# Patient Record
Sex: MALE | Race: WHITE | Employment: FULL TIME
[De-identification: names, ages, dates, MRNs, and addresses within clinical notes are randomized per-mention and may not be internally consistent; named-entity substitution may affect disease eponyms.]

---

## 2017-04-27 ENCOUNTER — TELEPHONE (OUTPATIENT)
Dept: CASE MANAGEMENT | Age: 47
End: 2017-04-27

## 2018-03-30 ENCOUNTER — OFFICE VISIT (OUTPATIENT)
Dept: FAMILY MEDICINE CLINIC | Age: 48
End: 2018-03-30
Payer: COMMERCIAL

## 2018-03-30 VITALS
DIASTOLIC BLOOD PRESSURE: 82 MMHG | WEIGHT: 149 LBS | HEART RATE: 95 BPM | OXYGEN SATURATION: 96 % | SYSTOLIC BLOOD PRESSURE: 138 MMHG | RESPIRATION RATE: 18 BRPM | HEIGHT: 64 IN | BODY MASS INDEX: 25.44 KG/M2

## 2018-03-30 DIAGNOSIS — Z11.4 SCREENING FOR HIV (HUMAN IMMUNODEFICIENCY VIRUS): ICD-10-CM

## 2018-03-30 DIAGNOSIS — E78.5 DYSLIPIDEMIA: ICD-10-CM

## 2018-03-30 DIAGNOSIS — Z76.89 ENCOUNTER TO ESTABLISH CARE: Primary | ICD-10-CM

## 2018-03-30 PROCEDURE — 99213 OFFICE O/P EST LOW 20 MIN: CPT | Performed by: FAMILY MEDICINE

## 2018-04-12 ENCOUNTER — OFFICE VISIT (OUTPATIENT)
Dept: FAMILY MEDICINE CLINIC | Age: 48
End: 2018-04-12
Payer: COMMERCIAL

## 2018-04-12 VITALS
DIASTOLIC BLOOD PRESSURE: 88 MMHG | BODY MASS INDEX: 25.1 KG/M2 | OXYGEN SATURATION: 98 % | SYSTOLIC BLOOD PRESSURE: 120 MMHG | HEIGHT: 64 IN | RESPIRATION RATE: 16 BRPM | WEIGHT: 147 LBS | HEART RATE: 88 BPM | TEMPERATURE: 99.2 F

## 2018-04-12 DIAGNOSIS — J98.01 BRONCHOSPASM: ICD-10-CM

## 2018-04-12 DIAGNOSIS — J40 BRONCHITIS: Primary | ICD-10-CM

## 2018-04-12 PROCEDURE — 99213 OFFICE O/P EST LOW 20 MIN: CPT | Performed by: PHYSICIAN ASSISTANT

## 2018-04-12 RX ORDER — DOXYCYCLINE HYCLATE 100 MG
100 TABLET ORAL 2 TIMES DAILY
Qty: 20 TABLET | Refills: 0 | Status: SHIPPED | OUTPATIENT
Start: 2018-04-12 | End: 2018-04-22

## 2018-04-12 RX ORDER — OMEPRAZOLE 10 MG/1
10 CAPSULE, DELAYED RELEASE ORAL DAILY
COMMUNITY
End: 2018-04-23 | Stop reason: ALTCHOICE

## 2018-04-20 ENCOUNTER — OFFICE VISIT (OUTPATIENT)
Dept: FAMILY MEDICINE CLINIC | Age: 48
End: 2018-04-20
Payer: COMMERCIAL

## 2018-04-20 VITALS
HEIGHT: 64 IN | DIASTOLIC BLOOD PRESSURE: 84 MMHG | HEART RATE: 100 BPM | WEIGHT: 146 LBS | SYSTOLIC BLOOD PRESSURE: 134 MMHG | BODY MASS INDEX: 24.92 KG/M2 | OXYGEN SATURATION: 98 % | RESPIRATION RATE: 18 BRPM

## 2018-04-20 DIAGNOSIS — R31.9 HEMATURIA, UNSPECIFIED TYPE: Primary | ICD-10-CM

## 2018-04-20 DIAGNOSIS — E78.5 DYSLIPIDEMIA: ICD-10-CM

## 2018-04-20 PROCEDURE — 99213 OFFICE O/P EST LOW 20 MIN: CPT | Performed by: FAMILY MEDICINE

## 2018-04-22 ENCOUNTER — HOSPITAL ENCOUNTER (OUTPATIENT)
Age: 48
Discharge: HOME | End: 2018-04-22
Payer: COMMERCIAL

## 2018-04-22 DIAGNOSIS — R31.9 HEMATURIA, UNSPECIFIED TYPE: ICD-10-CM

## 2018-04-22 DIAGNOSIS — E78.5 DYSLIPIDEMIA: ICD-10-CM

## 2018-04-22 DIAGNOSIS — Z11.4 SCREENING FOR HIV (HUMAN IMMUNODEFICIENCY VIRUS): ICD-10-CM

## 2018-04-22 LAB
ALBUMIN SERPL-MCNC: 4.6 G/DL (ref 3.5–5.2)
ALP BLD-CCNC: 86 U/L (ref 40–129)
ALT SERPL-CCNC: 26 U/L (ref 0–40)
AMORPHOUS: NORMAL
ANION GAP SERPL CALCULATED.3IONS-SCNC: 10 MMOL/L (ref 7–16)
AST SERPL-CCNC: 19 U/L (ref 0–39)
BACTERIA: NORMAL /HPF
BILIRUB SERPL-MCNC: 0.5 MG/DL (ref 0–1.2)
BILIRUBIN URINE: NEGATIVE
BLOOD, URINE: NORMAL
BUN BLDV-MCNC: 17 MG/DL (ref 6–20)
CALCIUM SERPL-MCNC: 9.6 MG/DL (ref 8.6–10.2)
CHLORIDE BLD-SCNC: 104 MMOL/L (ref 98–107)
CHOLESTEROL, TOTAL: 203 MG/DL (ref 0–199)
CLARITY: CLEAR
CO2: 28 MMOL/L (ref 22–29)
COLOR: YELLOW
CREAT SERPL-MCNC: 1 MG/DL (ref 0.7–1.2)
EPITHELIAL CELLS, UA: NORMAL /HPF
GFR AFRICAN AMERICAN: >60
GFR NON-AFRICAN AMERICAN: >60 ML/MIN/1.73
GLUCOSE BLD-MCNC: 106 MG/DL (ref 74–109)
GLUCOSE URINE: NEGATIVE MG/DL
HBA1C MFR BLD: 5.6 % (ref 4.8–5.9)
HCT VFR BLD CALC: 47.8 % (ref 37–54)
HDLC SERPL-MCNC: 61 MG/DL
HEMOGLOBIN: 15.9 G/DL (ref 12.5–16.5)
KETONES, URINE: NEGATIVE MG/DL
LDL CHOLESTEROL CALCULATED: 119 MG/DL (ref 0–99)
LEUKOCYTE ESTERASE, URINE: NEGATIVE
MCH RBC QN AUTO: 29.9 PG (ref 26–35)
MCHC RBC AUTO-ENTMCNC: 33.3 % (ref 32–34.5)
MCV RBC AUTO: 89.8 FL (ref 80–99.9)
NITRITE, URINE: NEGATIVE
PDW BLD-RTO: 12.9 FL (ref 11.5–15)
PH UA: 5.5 (ref 5–9)
PLATELET # BLD: 285 E9/L (ref 130–450)
PMV BLD AUTO: 9.6 FL (ref 7–12)
POTASSIUM SERPL-SCNC: 4.7 MMOL/L (ref 3.5–5)
PROTEIN UA: NEGATIVE MG/DL
RBC # BLD: 5.32 E12/L (ref 3.8–5.8)
RBC UA: NORMAL /HPF (ref 0–2)
SODIUM BLD-SCNC: 142 MMOL/L (ref 132–146)
SPECIFIC GRAVITY UA: >=1.03 (ref 1–1.03)
TOTAL PROTEIN: 8 G/DL (ref 6.4–8.3)
TRIGL SERPL-MCNC: 114 MG/DL (ref 0–149)
TSH SERPL DL<=0.05 MIU/L-ACNC: 3.3 UIU/ML (ref 0.27–4.2)
UROBILINOGEN, URINE: 0.2 E.U./DL
VITAMIN D 25-HYDROXY: 13 NG/ML (ref 30–100)
VLDLC SERPL CALC-MCNC: 23 MG/DL
WBC # BLD: 7.1 E9/L (ref 4.5–11.5)
WBC UA: NORMAL /HPF (ref 0–5)

## 2018-04-22 PROCEDURE — 80061 LIPID PANEL: CPT

## 2018-04-22 PROCEDURE — 85027 COMPLETE CBC AUTOMATED: CPT

## 2018-04-22 PROCEDURE — 82306 VITAMIN D 25 HYDROXY: CPT

## 2018-04-22 PROCEDURE — 36415 COLL VENOUS BLD VENIPUNCTURE: CPT

## 2018-04-22 PROCEDURE — 86703 HIV-1/HIV-2 1 RESULT ANTBDY: CPT

## 2018-04-22 PROCEDURE — 84443 ASSAY THYROID STIM HORMONE: CPT

## 2018-04-22 PROCEDURE — 81001 URINALYSIS AUTO W/SCOPE: CPT

## 2018-04-22 PROCEDURE — 80053 COMPREHEN METABOLIC PANEL: CPT

## 2018-04-22 PROCEDURE — 83036 HEMOGLOBIN GLYCOSYLATED A1C: CPT

## 2018-04-23 ENCOUNTER — TELEPHONE (OUTPATIENT)
Dept: FAMILY MEDICINE CLINIC | Age: 48
End: 2018-04-23

## 2018-04-23 ENCOUNTER — APPOINTMENT (OUTPATIENT)
Dept: CT IMAGING | Age: 48
End: 2018-04-23
Payer: COMMERCIAL

## 2018-04-23 ENCOUNTER — HOSPITAL ENCOUNTER (EMERGENCY)
Age: 48
Discharge: HOME | End: 2018-04-23
Attending: EMERGENCY MEDICINE
Payer: COMMERCIAL

## 2018-04-23 VITALS
HEIGHT: 64 IN | SYSTOLIC BLOOD PRESSURE: 110 MMHG | OXYGEN SATURATION: 97 % | RESPIRATION RATE: 19 BRPM | DIASTOLIC BLOOD PRESSURE: 76 MMHG | WEIGHT: 146 LBS | TEMPERATURE: 98.8 F | BODY MASS INDEX: 24.92 KG/M2 | HEART RATE: 85 BPM

## 2018-04-23 DIAGNOSIS — R00.0 TACHYCARDIA: Primary | ICD-10-CM

## 2018-04-23 DIAGNOSIS — R42 DIZZINESS: ICD-10-CM

## 2018-04-23 DIAGNOSIS — F41.1 ANXIETY STATE: Primary | ICD-10-CM

## 2018-04-23 LAB
ANION GAP SERPL CALCULATED.3IONS-SCNC: 14 MMOL/L (ref 7–16)
BUN BLDV-MCNC: 17 MG/DL (ref 6–20)
CALCIUM SERPL-MCNC: 9.6 MG/DL (ref 8.6–10.2)
CHLORIDE BLD-SCNC: 99 MMOL/L (ref 98–107)
CO2: 26 MMOL/L (ref 22–29)
CREAT SERPL-MCNC: 1 MG/DL (ref 0.7–1.2)
EKG ATRIAL RATE: 89 BPM
EKG P AXIS: 36 DEGREES
EKG P-R INTERVAL: 110 MS
EKG Q-T INTERVAL: 332 MS
EKG QRS DURATION: 92 MS
EKG QTC CALCULATION (BAZETT): 403 MS
EKG R AXIS: 25 DEGREES
EKG T AXIS: 27 DEGREES
EKG VENTRICULAR RATE: 89 BPM
GFR AFRICAN AMERICAN: >60
GFR NON-AFRICAN AMERICAN: >60 ML/MIN/1.73
GLUCOSE BLD-MCNC: 121 MG/DL (ref 74–109)
HIV-1 AND HIV-2 ANTIBODIES: NORMAL
MAGNESIUM: 2.1 MG/DL (ref 1.6–2.6)
POTASSIUM SERPL-SCNC: 3.9 MMOL/L (ref 3.5–5)
SODIUM BLD-SCNC: 139 MMOL/L (ref 132–146)

## 2018-04-23 PROCEDURE — 6370000000 HC RX 637 (ALT 250 FOR IP): Performed by: EMERGENCY MEDICINE

## 2018-04-23 PROCEDURE — 6360000004 HC RX CONTRAST MEDICATION: Performed by: RADIOLOGY

## 2018-04-23 PROCEDURE — 71275 CT ANGIOGRAPHY CHEST: CPT

## 2018-04-23 PROCEDURE — 99285 EMERGENCY DEPT VISIT HI MDM: CPT

## 2018-04-23 PROCEDURE — 80048 BASIC METABOLIC PNL TOTAL CA: CPT

## 2018-04-23 PROCEDURE — 83735 ASSAY OF MAGNESIUM: CPT

## 2018-04-23 RX ORDER — LORAZEPAM 1 MG/1
1 TABLET ORAL ONCE
Status: COMPLETED | OUTPATIENT
Start: 2018-04-23 | End: 2018-04-23

## 2018-04-23 RX ORDER — DOXYCYCLINE HYCLATE 100 MG
100 TABLET ORAL 2 TIMES DAILY
COMMUNITY
End: 2018-04-23 | Stop reason: ALTCHOICE

## 2018-04-23 RX ORDER — ERGOCALCIFEROL 1.25 MG/1
50000 CAPSULE ORAL WEEKLY
Qty: 12 CAPSULE | Refills: 0 | Status: SHIPPED | OUTPATIENT
Start: 2018-04-23 | End: 2018-07-30

## 2018-04-23 RX ADMIN — Medication 30 ML: at 10:18

## 2018-04-23 RX ADMIN — LORAZEPAM 1 MG: 1 TABLET ORAL at 10:23

## 2018-04-23 RX ADMIN — IOPAMIDOL 90 ML: 755 INJECTION, SOLUTION INTRAVENOUS at 11:41

## 2018-04-23 ASSESSMENT — ENCOUNTER SYMPTOMS
ORTHOPNEA: 0
HEMOPTYSIS: 0
VOMITING: 0
ABDOMINAL PAIN: 0
EYE DISCHARGE: 0
DIARRHEA: 0
NAUSEA: 0
SHORTNESS OF BREATH: 0
EYE PAIN: 0
WHEEZING: 0
EYE REDNESS: 0
BACK PAIN: 0
COUGH: 0
SINUS PRESSURE: 0
SORE THROAT: 0

## 2018-04-23 ASSESSMENT — PAIN DESCRIPTION - PAIN TYPE: TYPE: ACUTE PAIN

## 2018-04-23 ASSESSMENT — PAIN DESCRIPTION - ORIENTATION: ORIENTATION: MID

## 2018-04-23 ASSESSMENT — PAIN SCALES - GENERAL: PAINLEVEL_OUTOF10: 2

## 2018-04-23 ASSESSMENT — PAIN DESCRIPTION - ONSET: ONSET: GRADUAL

## 2018-04-23 ASSESSMENT — PAIN DESCRIPTION - LOCATION: LOCATION: CHEST

## 2018-04-23 ASSESSMENT — PAIN DESCRIPTION - PROGRESSION: CLINICAL_PROGRESSION: GRADUALLY WORSENING

## 2018-04-23 ASSESSMENT — PAIN DESCRIPTION - DESCRIPTORS: DESCRIPTORS: BURNING

## 2018-04-23 ASSESSMENT — PAIN DESCRIPTION - FREQUENCY: FREQUENCY: INTERMITTENT

## 2018-07-30 ENCOUNTER — OFFICE VISIT (OUTPATIENT)
Dept: FAMILY MEDICINE CLINIC | Age: 48
End: 2018-07-30
Payer: COMMERCIAL

## 2018-07-30 VITALS
WEIGHT: 147 LBS | SYSTOLIC BLOOD PRESSURE: 145 MMHG | HEIGHT: 64 IN | RESPIRATION RATE: 16 BRPM | HEART RATE: 88 BPM | BODY MASS INDEX: 25.1 KG/M2 | DIASTOLIC BLOOD PRESSURE: 101 MMHG

## 2018-07-30 DIAGNOSIS — I49.9 IRREGULAR HEART BEAT: Primary | ICD-10-CM

## 2018-07-30 PROCEDURE — 99213 OFFICE O/P EST LOW 20 MIN: CPT | Performed by: FAMILY MEDICINE

## 2018-07-30 ASSESSMENT — ENCOUNTER SYMPTOMS
CONSTIPATION: 0
WHEEZING: 0
ABDOMINAL PAIN: 0
DIARRHEA: 0
NAUSEA: 1
VOMITING: 0
SHORTNESS OF BREATH: 1
COUGH: 0

## 2018-08-07 ENCOUNTER — HOSPITAL ENCOUNTER (OUTPATIENT)
Dept: SLEEP CENTER | Age: 48
Discharge: HOME | End: 2018-08-07
Payer: COMMERCIAL

## 2018-08-07 PROCEDURE — 93225 XTRNL ECG REC<48 HRS REC: CPT

## 2018-08-07 PROCEDURE — 93226 XTRNL ECG REC<48 HR SCAN A/R: CPT

## 2018-08-15 DIAGNOSIS — I49.9 IRREGULAR HEART BEAT: ICD-10-CM

## 2018-09-04 ENCOUNTER — HOSPITAL ENCOUNTER (OUTPATIENT)
Age: 48
Discharge: HOME | End: 2018-09-04
Payer: COMMERCIAL

## 2018-09-04 ENCOUNTER — OFFICE VISIT (OUTPATIENT)
Dept: FAMILY MEDICINE CLINIC | Age: 48
End: 2018-09-04
Payer: COMMERCIAL

## 2018-09-04 VITALS
WEIGHT: 150 LBS | DIASTOLIC BLOOD PRESSURE: 96 MMHG | HEART RATE: 96 BPM | BODY MASS INDEX: 25.61 KG/M2 | SYSTOLIC BLOOD PRESSURE: 136 MMHG | OXYGEN SATURATION: 98 % | HEIGHT: 64 IN | RESPIRATION RATE: 16 BRPM

## 2018-09-04 DIAGNOSIS — R40.0 DAYTIME SLEEPINESS: ICD-10-CM

## 2018-09-04 DIAGNOSIS — R79.89 LOW VITAMIN D LEVEL: ICD-10-CM

## 2018-09-04 DIAGNOSIS — R00.2 PALPITATIONS: Primary | ICD-10-CM

## 2018-09-04 DIAGNOSIS — R06.83 SNORING: ICD-10-CM

## 2018-09-04 DIAGNOSIS — G47.9 RESTLESS SLEEPER: ICD-10-CM

## 2018-09-04 LAB — VITAMIN D 25-HYDROXY: 18 NG/ML (ref 30–100)

## 2018-09-04 PROCEDURE — 82306 VITAMIN D 25 HYDROXY: CPT

## 2018-09-04 PROCEDURE — 99213 OFFICE O/P EST LOW 20 MIN: CPT | Performed by: FAMILY MEDICINE

## 2018-09-04 PROCEDURE — 36415 COLL VENOUS BLD VENIPUNCTURE: CPT

## 2018-09-04 ASSESSMENT — ENCOUNTER SYMPTOMS
CONSTIPATION: 0
NAUSEA: 0
DIARRHEA: 0
VOMITING: 0

## 2018-09-04 ASSESSMENT — PATIENT HEALTH QUESTIONNAIRE - PHQ9
SUM OF ALL RESPONSES TO PHQ9 QUESTIONS 1 & 2: 0
2. FEELING DOWN, DEPRESSED OR HOPELESS: 0
1. LITTLE INTEREST OR PLEASURE IN DOING THINGS: 0
SUM OF ALL RESPONSES TO PHQ QUESTIONS 1-9: 0
SUM OF ALL RESPONSES TO PHQ QUESTIONS 1-9: 0

## 2018-09-04 NOTE — PROGRESS NOTES
Chief Complaint   Patient presents with    Irregular Heart Beat     follow up       HPI:  The patient is a 51 y.o. male who presented to the office today for follow up on palpitations. Patient has been seen by cardiology at [facility] in the mean time. Blood work for rheumatological origin of palpitations (CRP, NEVIN, WSR) negative. Extended rhythm recording device started 8/15/18. Palpitations  - continues to be daily  - had four days around birthday where he was feeling pretty good  - last Tuesday: \"heart was flopping all around\" after drinking a Gatorade  - \"Feels like someone is holding on to my heart and it can't pump\"   - occasional tingling sensation over whole body   - feels the cardiologist in [facility] was more concerned about the cough he had than his heart  - lying on his left side aggravates the symptoms   - does not drink caffienated drinks   - had sleep study scheduled in the past     STOP-Bang FRANDY Questions  Snoring (Do you snore loudly)? yes  Tiredness (Do you often feel tired, fatigued or sleepy during the daytime?) yes  Observed Apnea (Has anyone observed that you stop breathing, or choke or gasp during sleep?) no  High Blood Pressure (Do you have or are you being treated for HTN?) not treated, intermittent high readings   BMI (Is your body mass index more than 35 kg/m2 ?) no  Age (Are you older than 50 years?) no  Neck Circumference (Is your neck circumference greater than 40 cm [15.75 in]?) not measured  Gender (Are you male?) yes  ---------------------------------------  0-2: low risk  3-4: intermediate risk  5-8: high risk    Very restless sleeper. Wife reports that patient is a very restless sleeper, often kicking her in his sleep. Patient denies episodes of sleep paralysis and cataplexy.        Causes of Palpitation   Cardiac Patient factors   Any arrhythmia PVCs seen on 48h Holter   Cardiac and extracardiac shunts Echo unremarkable   Valvular heart disease Echo unremarkable

## 2018-09-06 ENCOUNTER — TELEPHONE (OUTPATIENT)
Dept: FAMILY MEDICINE CLINIC | Age: 48
End: 2018-09-06

## 2018-10-05 ENCOUNTER — OFFICE VISIT (OUTPATIENT)
Dept: FAMILY MEDICINE CLINIC | Age: 48
End: 2018-10-05
Payer: COMMERCIAL

## 2018-10-05 VITALS
WEIGHT: 151 LBS | DIASTOLIC BLOOD PRESSURE: 92 MMHG | HEIGHT: 64 IN | BODY MASS INDEX: 25.78 KG/M2 | OXYGEN SATURATION: 98 % | RESPIRATION RATE: 16 BRPM | SYSTOLIC BLOOD PRESSURE: 130 MMHG | HEART RATE: 93 BPM

## 2018-10-05 DIAGNOSIS — I10 ESSENTIAL HYPERTENSION: Primary | ICD-10-CM

## 2018-10-05 PROCEDURE — 99213 OFFICE O/P EST LOW 20 MIN: CPT | Performed by: FAMILY MEDICINE

## 2018-10-05 RX ORDER — LISINOPRIL 5 MG/1
5 TABLET ORAL DAILY
Qty: 30 TABLET | Refills: 3 | Status: SHIPPED | OUTPATIENT
Start: 2018-10-05 | End: 2018-10-09 | Stop reason: SINTOL

## 2018-10-05 RX ORDER — MULTIVIT-MIN/IRON/FOLIC ACID/K 18-600-40
CAPSULE ORAL
COMMUNITY
End: 2020-05-28 | Stop reason: ALTCHOICE

## 2018-10-05 ASSESSMENT — ENCOUNTER SYMPTOMS
CONSTIPATION: 0
COUGH: 1
DIARRHEA: 0
NAUSEA: 0
ABDOMINAL PAIN: 0
SHORTNESS OF BREATH: 1
VOMITING: 0

## 2018-10-05 NOTE — PROGRESS NOTES
S: 51 y.o. male with   Chief Complaint   Patient presents with    Palpitations     follow up       Palpitations - treated for anxiety at ER after an episode. Saw cardiology in the past and w/u with 48h monitor. Still having some sx and had re-eval with [facility] cardiology. ECHO done and remainder of w/u unremarkable. O: VS:  height is 5' 4\" (1.626 m) and weight is 151 lb (68.5 kg). His blood pressure is 130/92 (abnormal) and his pulse is 93. His respiration is 16 and oxygen saturation is 98%. AAO/NAD, appropriate affect for mood  CV:  RRR, no murmur  Resp: CTAB      Impression/Plan:   1) Essential Hypertension - particularly the diastolic has been elevated. Will need follow up for moods. Ok for lisinopril 10mg and BMP in 2weeks. Health Maintenance Due   Topic Date Due    DTaP/Tdap/Td vaccine (1 - Tdap) 08/24/1988    Flu vaccine (1) 09/01/2018         Attending Physician Statement  I have discussed the case, including pertinent history and exam findings with the resident. I agree with the documented assessment and plan.       Purvi Suero MD

## 2018-10-09 ENCOUNTER — TELEPHONE (OUTPATIENT)
Dept: FAMILY MEDICINE CLINIC | Age: 48
End: 2018-10-09

## 2018-10-09 DIAGNOSIS — I10 ESSENTIAL HYPERTENSION: Primary | ICD-10-CM

## 2018-10-09 RX ORDER — LOSARTAN POTASSIUM 25 MG/1
25 TABLET ORAL DAILY
Qty: 30 TABLET | Refills: 0 | Status: SHIPPED | OUTPATIENT
Start: 2018-10-09 | End: 2018-11-07 | Stop reason: SDUPTHER

## 2018-10-17 ENCOUNTER — TELEPHONE (OUTPATIENT)
Dept: FAMILY MEDICINE CLINIC | Age: 48
End: 2018-10-17

## 2018-10-17 NOTE — TELEPHONE ENCOUNTER
Patient called in stating he had palpitations and heart racing last night. Did not go to ER, did not want to make an appt with anyone at this time. He wanted to know if you wanted his BP medication, he did not take his BP to give me those numbers. Advised he needed to go to ER or see someone to adjust and evaluate his BP reading.  thanks

## 2018-10-24 ENCOUNTER — HOSPITAL ENCOUNTER (OUTPATIENT)
Dept: SLEEP CENTER | Age: 48
Discharge: HOME | End: 2018-10-24
Payer: COMMERCIAL

## 2018-10-24 DIAGNOSIS — R40.0 DAYTIME SLEEPINESS: ICD-10-CM

## 2018-10-24 DIAGNOSIS — G47.9 RESTLESS SLEEPER: ICD-10-CM

## 2018-10-24 DIAGNOSIS — R00.2 PALPITATIONS: ICD-10-CM

## 2018-10-24 DIAGNOSIS — R06.83 SNORING: ICD-10-CM

## 2018-10-24 PROCEDURE — 95810 POLYSOM 6/> YRS 4/> PARAM: CPT

## 2018-10-24 ASSESSMENT — SLEEP AND FATIGUE QUESTIONNAIRES
HOW LIKELY ARE YOU TO NOD OFF OR FALL ASLEEP WHILE SITTING QUIETLY AFTER LUNCH WITHOUT ALCOHOL: 2
HOW LIKELY ARE YOU TO NOD OFF OR FALL ASLEEP IN A CAR, WHILE STOPPED FOR A FEW MINUTES IN TRAFFIC: 0
HOW LIKELY ARE YOU TO NOD OFF OR FALL ASLEEP WHILE SITTING AND TALKING TO SOMEONE: 0
HOW LIKELY ARE YOU TO NOD OFF OR FALL ASLEEP WHILE SITTING INACTIVE IN A PUBLIC PLACE: 0
HOW LIKELY ARE YOU TO NOD OFF OR FALL ASLEEP WHILE LYING DOWN TO REST IN THE AFTERNOON WHEN CIRCUMSTANCES PERMIT: 3
HOW LIKELY ARE YOU TO NOD OFF OR FALL ASLEEP WHILE SITTING AND READING: 2
HOW LIKELY ARE YOU TO NOD OFF OR FALL ASLEEP WHEN YOU ARE A PASSENGER IN A CAR FOR AN HOUR WITHOUT A BREAK: 2
ESS TOTAL SCORE: 10
HOW LIKELY ARE YOU TO NOD OFF OR FALL ASLEEP WHILE WATCHING TV: 1

## 2018-10-25 VITALS
SYSTOLIC BLOOD PRESSURE: 122 MMHG | HEIGHT: 64 IN | HEART RATE: 78 BPM | WEIGHT: 145 LBS | OXYGEN SATURATION: 95 % | BODY MASS INDEX: 24.75 KG/M2 | DIASTOLIC BLOOD PRESSURE: 91 MMHG

## 2018-10-30 ENCOUNTER — HOSPITAL ENCOUNTER (OUTPATIENT)
Age: 48
Discharge: HOME | End: 2018-10-30
Payer: COMMERCIAL

## 2018-10-30 DIAGNOSIS — I10 ESSENTIAL HYPERTENSION: ICD-10-CM

## 2018-10-30 LAB
ANION GAP SERPL CALCULATED.3IONS-SCNC: 11 MMOL/L (ref 7–16)
BUN BLDV-MCNC: 14 MG/DL (ref 6–20)
CALCIUM SERPL-MCNC: 9.6 MG/DL (ref 8.6–10.2)
CHLORIDE BLD-SCNC: 99 MMOL/L (ref 98–107)
CO2: 29 MMOL/L (ref 22–29)
CREAT SERPL-MCNC: 0.9 MG/DL (ref 0.7–1.2)
GFR AFRICAN AMERICAN: >60
GFR NON-AFRICAN AMERICAN: >60 ML/MIN/1.73
GLUCOSE BLD-MCNC: 95 MG/DL (ref 74–109)
POTASSIUM SERPL-SCNC: 3.9 MMOL/L (ref 3.5–5)
SODIUM BLD-SCNC: 139 MMOL/L (ref 132–146)

## 2018-10-30 PROCEDURE — 36415 COLL VENOUS BLD VENIPUNCTURE: CPT

## 2018-10-30 PROCEDURE — 80048 BASIC METABOLIC PNL TOTAL CA: CPT

## 2018-11-05 ENCOUNTER — TELEPHONE (OUTPATIENT)
Dept: FAMILY MEDICINE CLINIC | Age: 48
End: 2018-11-05

## 2018-11-05 DIAGNOSIS — G47.61 PERIODIC LIMB MOVEMENTS OF SLEEP: Primary | ICD-10-CM

## 2018-11-07 ENCOUNTER — OFFICE VISIT (OUTPATIENT)
Dept: FAMILY MEDICINE CLINIC | Age: 48
End: 2018-11-07
Payer: COMMERCIAL

## 2018-11-07 VITALS
HEIGHT: 64 IN | HEART RATE: 101 BPM | BODY MASS INDEX: 25.78 KG/M2 | SYSTOLIC BLOOD PRESSURE: 130 MMHG | WEIGHT: 151 LBS | RESPIRATION RATE: 16 BRPM | DIASTOLIC BLOOD PRESSURE: 92 MMHG

## 2018-11-07 DIAGNOSIS — I10 ESSENTIAL HYPERTENSION: ICD-10-CM

## 2018-11-07 PROCEDURE — 99213 OFFICE O/P EST LOW 20 MIN: CPT | Performed by: FAMILY MEDICINE

## 2018-11-07 RX ORDER — LOSARTAN POTASSIUM 25 MG/1
25 TABLET ORAL DAILY
Qty: 30 TABLET | Refills: 5 | Status: SHIPPED | OUTPATIENT
Start: 2018-11-07 | End: 2018-12-28

## 2018-11-07 ASSESSMENT — ENCOUNTER SYMPTOMS
SHORTNESS OF BREATH: 1
ABDOMINAL PAIN: 0
DIARRHEA: 0
NAUSEA: 0
VOMITING: 0
CONSTIPATION: 0
COUGH: 1

## 2018-11-07 NOTE — PROGRESS NOTES
Follow up of palpitations  Recent cardiac work up is negative  Examination  Blood pressure (!) 130/92, pulse 101, resp. rate 16, height 5' 4\" (1.626 m), weight 151 lb (68.5 kg). Heart regular  Lungs clear  A/P  24 hour urine for catecholamines  Attending Physician Statement  I have discussed the case, including pertinent history and exam findings with the resident. I agree with the documented assessment and plan.
 Hypertension Father     Parkinsonism Mother 48    Heart Disease Paternal Uncle     Kidney Cancer Paternal Uncle     Cancer Maternal Aunt         bladder cancer    Heart Disease Maternal Aunt         Hypertrophy    Heart Disease Paternal Grandfather        Past Surgical History:   Procedure Laterality Date    CHOLECYSTECTOMY      INGUINAL HERNIA REPAIR      LITHOTRIPSY      TONSILLECTOMY         Social History   Substance Use Topics    Smoking status: Never Smoker    Smokeless tobacco: Never Used    Alcohol use No       BP (!) 130/92   Pulse 101   Resp 16   Ht 5' 4\" (1.626 m)   Wt 151 lb (68.5 kg)   BMI 25.92 kg/m²     Review of Systems   Constitutional: Negative for chills and fever. Respiratory: Positive for cough and shortness of breath. Cardiovascular: Positive for palpitations. Negative for chest pain. Gastrointestinal: Negative for abdominal pain, constipation, diarrhea, nausea and vomiting. Genitourinary: Negative for dysuria, frequency and hematuria. Skin: Negative for rash. Neurological: Positive for light-headedness and headaches. Negative for dizziness. Physical Exam   Constitutional: He is oriented to person, place, and time. He appears well-developed and well-nourished. No distress. HENT:   Head: Normocephalic and atraumatic. Eyes: Conjunctivae are normal.   Neck: Neck supple. Cardiovascular: Normal rate, regular rhythm and normal heart sounds. Exam reveals no gallop and no friction rub. No murmur heard. Pulmonary/Chest: Effort normal and breath sounds normal. No respiratory distress. He has no wheezes. He has no rales. Abdominal: Soft. He exhibits no distension. There is no tenderness. There is no rebound and no guarding. Musculoskeletal: He exhibits no edema. Neurological: He is alert and oriented to person, place, and time. Skin: Skin is warm and dry. He is not diaphoretic. Psychiatric: He has a normal mood and affect.  His behavior is

## 2018-11-11 ENCOUNTER — HOSPITAL ENCOUNTER (OUTPATIENT)
Age: 48
Discharge: HOME | End: 2018-11-11
Payer: COMMERCIAL

## 2018-11-11 DIAGNOSIS — I10 ESSENTIAL HYPERTENSION: ICD-10-CM

## 2018-11-11 PROCEDURE — 83835 ASSAY OF METANEPHRINES: CPT

## 2018-11-15 LAB
CREATININE 24 HOUR URINE: 1113 MG/D (ref 1000–2500)
CREATININE URINE: 106 MG/DL
HOURS COLLECTED: 24 HR
METANEPHRINE INTREP URINE: NORMAL
METANEPHRINE UG/G CRE: 90 UG/G CRT (ref 0–300)
METANEPHRINE, UR-PER VOL: 95 UG/L
METANEPHRINES URINE: 100 UG/D (ref 62–207)
NORMETANEPHRINE 24 HOUR URINE: 232 UG/D (ref 125–510)
NORMETANEPHRINE, (G/CRT): 208 UG/G CRT (ref 0–400)
NORMETANEPHRINES, NMOL/L: 221 UG/L
URINE TOTAL VOLUME: 1050 ML

## 2018-12-10 ENCOUNTER — TELEPHONE (OUTPATIENT)
Dept: FAMILY MEDICINE CLINIC | Age: 48
End: 2018-12-10

## 2018-12-10 DIAGNOSIS — R00.2 PALPITATIONS: Primary | ICD-10-CM

## 2018-12-10 NOTE — TELEPHONE ENCOUNTER
Patient would like a referral Endocrinology closer to home than [place].   States they have spoke to you about this in the past.

## 2018-12-28 ENCOUNTER — HOSPITAL ENCOUNTER (OUTPATIENT)
Age: 48
Discharge: HOME | End: 2018-12-30
Payer: COMMERCIAL

## 2018-12-28 ENCOUNTER — OFFICE VISIT (OUTPATIENT)
Dept: FAMILY MEDICINE CLINIC | Age: 48
End: 2018-12-28
Payer: COMMERCIAL

## 2018-12-28 VITALS
SYSTOLIC BLOOD PRESSURE: 124 MMHG | WEIGHT: 153 LBS | BODY MASS INDEX: 26.12 KG/M2 | DIASTOLIC BLOOD PRESSURE: 80 MMHG | HEIGHT: 64 IN | OXYGEN SATURATION: 98 % | HEART RATE: 94 BPM | RESPIRATION RATE: 18 BRPM

## 2018-12-28 DIAGNOSIS — E55.9 VITAMIN D DEFICIENCY: ICD-10-CM

## 2018-12-28 DIAGNOSIS — R00.2 PALPITATIONS: Primary | ICD-10-CM

## 2018-12-28 PROCEDURE — 99213 OFFICE O/P EST LOW 20 MIN: CPT | Performed by: FAMILY MEDICINE

## 2018-12-28 RX ORDER — METOPROLOL SUCCINATE 25 MG/1
25 TABLET, EXTENDED RELEASE ORAL 2 TIMES DAILY
COMMUNITY
Start: 2018-12-24 | End: 2020-06-30 | Stop reason: SDUPTHER

## 2018-12-28 ASSESSMENT — ENCOUNTER SYMPTOMS
SHORTNESS OF BREATH: 0
ABDOMINAL PAIN: 0
COUGH: 1
NAUSEA: 0
CONSTIPATION: 0
CHEST TIGHTNESS: 1
VOMITING: 0
DIARRHEA: 0

## 2018-12-28 NOTE — PROGRESS NOTES
[facility]  Precepting Note    Subjective:  Recheck of palpitations  Saw cardio @ [facility]. Tolerating metoprolol   Low vit D was noted as well. Was reportedly advised to see endo because of issues with elevated metanephrine's     ROS otherwise negative     Past medical, surgical, family and social history were reviewed, non-contributory, and unchanged unless otherwise stated. Objective:    /80   Pulse 94   Resp 18   Ht 5' 4\" (1.626 m)   Wt 153 lb (69.4 kg)   SpO2 98%   BMI 26.26 kg/m²     Exam is as noted by resident with the following changes, additions or corrections:    General:  NAD; alert & oriented x 3   Heart:  RRR, no murmurs, gallops, or rubs. Lungs:  CTA bilaterally, no wheeze, rales or rhonchi  Abd: bowel sounds present, nontender, nondistended, no masses      Assessment/Plan:  Tachycardia   Improving  Elevated urine metanephrine level    Refer to endo      Attending Physician Statement  I have reviewed the chart, including any radiology or labs. I have discussed the case, including pertinent history and exam findings with the resident. I agree with the assessment, plan and orders as documented by the resident. Please refer to the resident note for additional information.       Electronically signed by Deuce Munoz MD on 12/28/2018 at 3:23 PM

## 2018-12-29 PROCEDURE — 82652 VIT D 1 25-DIHYDROXY: CPT

## 2018-12-31 LAB — VITAMIN D 1,25-DIHYDROXY: 46 PG/ML (ref 19.9–79.3)

## 2019-01-01 ENCOUNTER — TELEPHONE (OUTPATIENT)
Dept: FAMILY MEDICINE CLINIC | Age: 49
End: 2019-01-01

## 2019-01-01 DIAGNOSIS — E55.9 VITAMIN D DEFICIENCY: Primary | ICD-10-CM

## 2019-01-05 ENCOUNTER — HOSPITAL ENCOUNTER (OUTPATIENT)
Age: 49
Discharge: HOME | End: 2019-01-05
Payer: COMMERCIAL

## 2019-01-05 DIAGNOSIS — E55.9 VITAMIN D DEFICIENCY: ICD-10-CM

## 2019-01-05 LAB — VITAMIN D 25-HYDROXY: 22 NG/ML (ref 30–100)

## 2019-01-05 PROCEDURE — 36415 COLL VENOUS BLD VENIPUNCTURE: CPT

## 2019-01-05 PROCEDURE — 82306 VITAMIN D 25 HYDROXY: CPT

## 2019-01-07 ENCOUNTER — TELEPHONE (OUTPATIENT)
Dept: FAMILY MEDICINE CLINIC | Age: 49
End: 2019-01-07

## 2019-01-07 DIAGNOSIS — E55.9 VITAMIN D DEFICIENCY: Primary | ICD-10-CM

## 2019-02-05 ENCOUNTER — HOSPITAL ENCOUNTER (OUTPATIENT)
Age: 49
Discharge: HOME | End: 2019-02-05
Payer: COMMERCIAL

## 2019-02-05 LAB
T3 FREE: 3.5 PG/ML (ref 2–4.4)
T4 FREE: 1.11 NG/DL (ref 0.93–1.7)
TSH SERPL DL<=0.05 MIU/L-ACNC: 2.11 UIU/ML (ref 0.27–4.2)

## 2019-02-05 PROCEDURE — 84443 ASSAY THYROID STIM HORMONE: CPT

## 2019-02-05 PROCEDURE — 84439 ASSAY OF FREE THYROXINE: CPT

## 2019-02-05 PROCEDURE — 36415 COLL VENOUS BLD VENIPUNCTURE: CPT

## 2019-02-05 PROCEDURE — 84481 FREE ASSAY (FT-3): CPT

## 2019-02-10 ENCOUNTER — HOSPITAL ENCOUNTER (OUTPATIENT)
Age: 49
Discharge: HOME | End: 2019-02-10
Payer: COMMERCIAL

## 2019-02-10 PROCEDURE — 83835 ASSAY OF METANEPHRINES: CPT

## 2019-02-10 PROCEDURE — 82570 ASSAY OF URINE CREATININE: CPT

## 2019-02-10 PROCEDURE — 82384 ASSAY THREE CATECHOLAMINES: CPT

## 2019-02-11 LAB
24HR URINE VOLUME (ML): 1300 ML
CREATININE 24 HOUR URINE: 1417 MG/24H (ref 980–2200)
Lab: 24 HOURS

## 2019-02-15 LAB
CATE U INT: NORMAL
DOPAMINE (G CRT): 153 UG/G CRT (ref 0–250)
DOPAMINE 24 HOUR URINE: 209 UG/D (ref 77–324)
DOPAMINE, (UG/L): 161 UG/L
EPINEPHRINE (G CRT): 2 UG/G CRT (ref 0–20)
EPINEPHRINE 24 HOUR URINE: 3 UG/D (ref 1–7)
EPINEPHRINE, (UG/L): 2 UG/L
NOREPINEPH (G CRT): 17 UG/G CRT (ref 0–45)
NOREPINEPHRINE 24 HOUR URINE: 23 UG/D (ref 16–71)
NOREPINEPHRINE, (UG/L): 18 UG/L

## 2019-02-16 LAB
CREATININE 24 HOUR URINE: 1365 MG/D (ref 1000–2500)
CREATININE URINE: 105 MG/DL
HOURS COLLECTED: 24 HR
METANEPHRINE INTREP URINE: NORMAL
METANEPHRINE UG/G CRE: 76 UG/G CRT (ref 0–300)
METANEPHRINE, UR-PER VOL: 80 UG/L
METANEPHRINES URINE: 104 UG/D (ref 62–207)
NORMETANEPHRINE 24 HOUR URINE: 234 UG/D (ref 125–510)
NORMETANEPHRINE, (G/CRT): 171 UG/G CRT (ref 0–400)
NORMETANEPHRINES, NMOL/L: 180 UG/L
URINE TOTAL VOLUME: 1300 ML

## 2019-04-02 ENCOUNTER — TELEPHONE (OUTPATIENT)
Dept: FAMILY MEDICINE CLINIC | Age: 49
End: 2019-04-02

## 2019-04-04 NOTE — TELEPHONE ENCOUNTER
Spoke with Noam Barbour about the results from the lab work done by endocrinology. Will discuss further at next weeks visit.

## 2019-04-10 ENCOUNTER — OFFICE VISIT (OUTPATIENT)
Dept: FAMILY MEDICINE CLINIC | Age: 49
End: 2019-04-10
Payer: COMMERCIAL

## 2019-04-10 VITALS
HEIGHT: 64 IN | WEIGHT: 158 LBS | BODY MASS INDEX: 26.98 KG/M2 | HEART RATE: 101 BPM | SYSTOLIC BLOOD PRESSURE: 120 MMHG | RESPIRATION RATE: 14 BRPM | DIASTOLIC BLOOD PRESSURE: 70 MMHG | OXYGEN SATURATION: 98 %

## 2019-04-10 DIAGNOSIS — R00.2 PALPITATIONS: Primary | ICD-10-CM

## 2019-04-10 PROCEDURE — 99213 OFFICE O/P EST LOW 20 MIN: CPT | Performed by: FAMILY MEDICINE

## 2019-04-10 ASSESSMENT — ENCOUNTER SYMPTOMS
DIARRHEA: 0
CONSTIPATION: 0
COUGH: 0
ABDOMINAL PAIN: 0
NAUSEA: 0
SHORTNESS OF BREATH: 0
VOMITING: 0

## 2019-04-10 ASSESSMENT — PATIENT HEALTH QUESTIONNAIRE - PHQ9
SUM OF ALL RESPONSES TO PHQ QUESTIONS 1-9: 0
2. FEELING DOWN, DEPRESSED OR HOPELESS: 0
SUM OF ALL RESPONSES TO PHQ QUESTIONS 1-9: 0
1. LITTLE INTEREST OR PLEASURE IN DOING THINGS: 0
SUM OF ALL RESPONSES TO PHQ9 QUESTIONS 1 & 2: 0

## 2019-04-10 NOTE — PROGRESS NOTES
Chief Complaint   Patient presents with    3 Month Follow-Up     HTN       HPI:  The patient is a 51 y.o. male who presented to the office today to follow up on palpitations.      Palpitations  - currently taking:  Metoprolol XL 25 mg in the AM  Metoprolol XL 12.5 mg in the PM  - blood pressure 120/70 today  - has felt better over all, less heart thumping    - was on a fast-paced job recently and felt very tachycardic throughout and into the next day, considered going to the ED but felt somewhat improved by next day  - has felt somewhat off since  - has telephone appointment with [facility] cardiology next Monday  - saw endocrinologist in [facility]: was not informed about results and would like second opinion   - has a endocrinologist in [address] [place] [address] he would like to see    Past Medical History:   Diagnosis Date    Colitis     Dr. Jefferson Montalvo in [address] Kidney stone 4/24/2013    Nephrolithiasis          Current Outpatient Medications:     metoprolol succinate (TOPROL XL) 25 MG extended release tablet, Take 37.5 mg by mouth daily , Disp: , Rfl:     Cholecalciferol (VITAMIN D) 2000 units CAPS capsule, Take by mouth, Disp: , Rfl:     Multiple Vitamins-Minerals (MULTIVITAMIN ADULT PO), Take by mouth, Disp: , Rfl:     Allergies   Allergen Reactions    Clarithromycin     Zofran [Ondansetron Hcl] Hives    Lisinopril Rash       Family History   Problem Relation Age of Onset    Heart Disease Father 46    Hypertension Father     Parkinsonism Mother 48    Heart Disease Paternal Uncle     Kidney Cancer Paternal Uncle     Cancer Maternal Aunt         bladder cancer    Heart Disease Maternal Aunt         Hypertrophy    Heart Disease Paternal Grandfather        Past Surgical History:   Procedure Laterality Date    CHOLECYSTECTOMY      INGUINAL HERNIA REPAIR      LITHOTRIPSY      TONSILLECTOMY         Social History     Tobacco Use    Smoking status: Never Smoker    Smokeless tobacco: Never Used Substance Use Topics    Alcohol use: No    Drug use: No       /70 (Site: Left Upper Arm, Position: Sitting, Cuff Size: Medium Adult)   Pulse 101   Resp 14   Ht 5' 4\" (1.626 m)   Wt 158 lb (71.7 kg)   SpO2 98%   BMI 27.12 kg/m²     Review of Systems   Constitutional: Negative for chills and fever. Respiratory: Negative for cough and shortness of breath. Cardiovascular: Positive for palpitations. Negative for chest pain. Gastrointestinal: Negative for abdominal pain, constipation, diarrhea, nausea and vomiting. Genitourinary: Negative for dysuria, frequency and hematuria. Skin: Negative for rash. Neurological: Negative for dizziness, light-headedness and headaches. Physical Exam   Constitutional: He is oriented to person, place, and time. He appears well-developed and well-nourished. No distress. HENT:   Head: Normocephalic and atraumatic. Eyes: Conjunctivae are normal.   Cardiovascular: Normal rate, regular rhythm and normal heart sounds. Exam reveals no gallop and no friction rub. No murmur heard. Pulmonary/Chest: Effort normal and breath sounds normal. No stridor. No respiratory distress. He has no wheezes. Abdominal: Soft. Neurological: He is alert and oriented to person, place, and time. Skin: Skin is warm. He is not diaphoretic. Vitals reviewed. ASSESSMENT      Diagnosis Orders   1. Palpitations  External Referral To Endocrinology         PLAN:     Orders Placed This Encounter   Procedures    External Referral To Endocrinology     Referral Priority:   Routine     Referral Type:   Eval and Treat     Referral Reason:   Specialty Services Required     Requested Specialty:   Endocrinology     Number of Visits Requested:   1       Fabiola Whitt was seen today for 3 month follow-up. Diagnoses and all orders for this visit:    1.  Palpitations  -    Improving overall  - Continue metoprolol as recommended by cardiology   - External Referral To Endocrinology; patient looking to go to [place]  - If endocrinologist workup is unremarkable, will consider SSRI discussion with patient     Return in about 6 months (around 10/10/2019) for palpitation. , or sooner if necessary. Counseled regarding above diagnosis, including possible risks and complications,  especially if left uncontrolled. Counseled regarding the possible side effects, risks, benefits and alternatives to treatment; patient verbalizes understanding, agrees, feels comfortable with and wishes to proceed with above treatment plan. Patient verbalizes understanding and agrees with above counseling, assessment and plan. All questions answered. Denny Quiñonez was instructed to call if any new symptoms develop prior to next visit.        Mellisa Venegas M.D

## 2019-04-10 NOTE — PROGRESS NOTES
Follow up of palpitations  On metoprolol  Examination  Blood pressure 120/70, pulse 101, resp. rate 14, height 5' 4\" (1.626 m), weight 158 lb (71.7 kg), SpO2 98 %. Stable exam  A/P  Likely anxiety   Follow up 6 months  Attending Physician Statement  I have discussed the case, including pertinent history and exam findings with the resident. I agree with the documented assessment and plan.

## 2019-09-28 ENCOUNTER — HOSPITAL ENCOUNTER (OUTPATIENT)
Age: 49
Discharge: HOME | End: 2019-09-28
Payer: COMMERCIAL

## 2019-09-28 PROCEDURE — 84270 ASSAY OF SEX HORMONE GLOBUL: CPT

## 2019-09-28 PROCEDURE — 84403 ASSAY OF TOTAL TESTOSTERONE: CPT

## 2019-10-01 LAB
SEX HORMONE BINDING GLOBULIN: 29 NMOL/L (ref 11–80)
TESTOSTERONE FREE-NONMALE: 47.2 PG/ML (ref 47–244)
TESTOSTERONE TOTAL: 228 NG/DL (ref 220–1000)

## 2019-11-06 ENCOUNTER — HOSPITAL ENCOUNTER (OUTPATIENT)
Dept: MRI IMAGING | Age: 49
Discharge: HOME | End: 2019-11-08
Payer: COMMERCIAL

## 2019-11-06 DIAGNOSIS — G96.9 DISORDER OF CENTRAL NERVOUS SYSTEM: ICD-10-CM

## 2019-12-05 ENCOUNTER — OFFICE VISIT (OUTPATIENT)
Dept: FAMILY MEDICINE CLINIC | Age: 49
End: 2019-12-05
Payer: COMMERCIAL

## 2019-12-05 ENCOUNTER — HOSPITAL ENCOUNTER (OUTPATIENT)
Age: 49
Discharge: HOME | End: 2019-12-07
Payer: COMMERCIAL

## 2019-12-05 VITALS
BODY MASS INDEX: 26.46 KG/M2 | DIASTOLIC BLOOD PRESSURE: 80 MMHG | HEIGHT: 64 IN | RESPIRATION RATE: 18 BRPM | TEMPERATURE: 98.1 F | HEART RATE: 87 BPM | OXYGEN SATURATION: 98 % | WEIGHT: 155 LBS | SYSTOLIC BLOOD PRESSURE: 128 MMHG

## 2019-12-05 DIAGNOSIS — R30.0 DYSURIA: Primary | ICD-10-CM

## 2019-12-05 DIAGNOSIS — R30.0 DYSURIA: ICD-10-CM

## 2019-12-05 LAB
BILIRUBIN, POC: NEGATIVE
BLOOD URINE, POC: NORMAL
CLARITY, POC: CLEAR
COLOR, POC: YELLOW
GLUCOSE URINE, POC: NEGATIVE
KETONES, POC: NEGATIVE
LEUKOCYTE EST, POC: NEGATIVE
NITRITE, POC: NEGATIVE
PH, POC: 7.5
PROTEIN, POC: NEGATIVE
SPECIFIC GRAVITY, POC: 1.01
UROBILINOGEN, POC: 0.2

## 2019-12-05 PROCEDURE — 81002 URINALYSIS NONAUTO W/O SCOPE: CPT | Performed by: PHYSICIAN ASSISTANT

## 2019-12-05 PROCEDURE — 99213 OFFICE O/P EST LOW 20 MIN: CPT | Performed by: PHYSICIAN ASSISTANT

## 2019-12-05 PROCEDURE — 87088 URINE BACTERIA CULTURE: CPT

## 2019-12-08 LAB — URINE CULTURE, ROUTINE: NORMAL

## 2020-01-13 ENCOUNTER — TELEPHONE (OUTPATIENT)
Dept: FAMILY MEDICINE CLINIC | Age: 50
End: 2020-01-13

## 2020-01-13 NOTE — TELEPHONE ENCOUNTER
pts emergency contact called in and stated that she spoke to PCP earlier regarding a medication to help calm pt down during MRI     Please advise  Thanks

## 2020-01-14 RX ORDER — DIAZEPAM 5 MG/1
TABLET ORAL
Qty: 2 TABLET | Refills: 0 | Status: SHIPPED | OUTPATIENT
Start: 2020-01-14 | End: 2020-01-21

## 2020-01-16 ENCOUNTER — OFFICE VISIT (OUTPATIENT)
Dept: FAMILY MEDICINE CLINIC | Age: 50
End: 2020-01-16
Payer: COMMERCIAL

## 2020-01-16 ENCOUNTER — HOSPITAL ENCOUNTER (OUTPATIENT)
Age: 50
Discharge: HOME | End: 2020-01-18
Payer: COMMERCIAL

## 2020-01-16 VITALS
TEMPERATURE: 98.2 F | BODY MASS INDEX: 27.31 KG/M2 | HEART RATE: 89 BPM | DIASTOLIC BLOOD PRESSURE: 85 MMHG | HEIGHT: 64 IN | OXYGEN SATURATION: 97 % | WEIGHT: 160 LBS | SYSTOLIC BLOOD PRESSURE: 122 MMHG

## 2020-01-16 LAB
BILIRUBIN, POC: ABNORMAL
BLOOD URINE, POC: ABNORMAL
CLARITY, POC: ABNORMAL
COLOR, POC: YELLOW
GLUCOSE URINE, POC: ABNORMAL
KETONES, POC: ABNORMAL
LEUKOCYTE EST, POC: ABNORMAL
NITRITE, POC: ABNORMAL
PH, POC: 5.5
PROTEIN, POC: ABNORMAL
SPECIFIC GRAVITY, POC: 1.02
UROBILINOGEN, POC: 0.2

## 2020-01-16 PROCEDURE — 81002 URINALYSIS NONAUTO W/O SCOPE: CPT | Performed by: PHYSICIAN ASSISTANT

## 2020-01-16 PROCEDURE — 99213 OFFICE O/P EST LOW 20 MIN: CPT | Performed by: PHYSICIAN ASSISTANT

## 2020-01-16 PROCEDURE — 87088 URINE BACTERIA CULTURE: CPT

## 2020-01-16 RX ORDER — CIPROFLOXACIN 500 MG/1
500 TABLET, FILM COATED ORAL 2 TIMES DAILY
Qty: 20 TABLET | Refills: 0 | Status: SHIPPED | OUTPATIENT
Start: 2020-01-16 | End: 2020-01-26

## 2020-01-16 NOTE — PROGRESS NOTES
2020   [address] [place] [address] Tawana Net  : 1970  Age: 47 y.o. Sex: male       Subjective:  Chief Complaint   Patient presents with    Dysuria       HPI:The patient states he had lithotripsy 1 week ago for kidney stones on the right side by Dr. Joanna Guzman. Patient states last 2 days he had some dysuria  pressure with urination Patient is concerned for possible urinary tract infection therefore presented to express. Denies urinary frequency urgency or gross hematuria. Patient does feel like he is emptying his bladder. Denies back or flank pain. The patient denies suprapubic pressure. The patient denies any abdominal or flank pain. The patient denies nausea and vomiting. No fevers or chills. Denies any perineal or rectal pain. Denies any penile discharge or any history of STDs. They come to the urgent care for evaluation     ROS:  Positive and pertinent negatives as per HPI. All other systems are reviewed and negative. Current Outpatient Medications:     ciprofloxacin (CIPRO) 500 MG tablet, Take 1 tablet by mouth 2 times daily for 10 days, Disp: 20 tablet, Rfl: 0    diazepam (VALIUM) 5 MG tablet, Take 1 pill 30-60 minutes prior to procedure. If needed, take second pill during procedure.  Patient is not allowed to drive on day of procedure and needs to have a  to and from procedure., Disp: 2 tablet, Rfl: 0    metoprolol succinate (TOPROL XL) 25 MG extended release tablet, Take 37.5 mg by mouth daily , Disp: , Rfl:     Cholecalciferol (VITAMIN D) 2000 units CAPS capsule, Take by mouth, Disp: , Rfl:     Multiple Vitamins-Minerals (MULTIVITAMIN ADULT PO), Take by mouth, Disp: , Rfl:    Allergies   Allergen Reactions    Clarithromycin     Diphenhydramine     Oxycodone     Zofran [Ondansetron Hcl] Hives    Lisinopril Rash        Objective:  Vitals:    20 1326   BP: 122/85   Pulse: 89   Temp: 98.2

## 2020-01-19 LAB — URINE CULTURE, ROUTINE: NORMAL

## 2020-05-28 ENCOUNTER — OFFICE VISIT (OUTPATIENT)
Dept: FAMILY MEDICINE CLINIC | Age: 50
End: 2020-05-28
Payer: COMMERCIAL

## 2020-05-28 ENCOUNTER — TELEPHONE (OUTPATIENT)
Dept: FAMILY MEDICINE CLINIC | Age: 50
End: 2020-05-28

## 2020-05-28 VITALS
HEIGHT: 64 IN | BODY MASS INDEX: 26.7 KG/M2 | HEART RATE: 103 BPM | DIASTOLIC BLOOD PRESSURE: 88 MMHG | SYSTOLIC BLOOD PRESSURE: 123 MMHG | RESPIRATION RATE: 18 BRPM | WEIGHT: 156.4 LBS | OXYGEN SATURATION: 97 % | TEMPERATURE: 98.7 F

## 2020-05-28 PROCEDURE — 93000 ELECTROCARDIOGRAM COMPLETE: CPT | Performed by: FAMILY MEDICINE

## 2020-05-28 PROCEDURE — 99213 OFFICE O/P EST LOW 20 MIN: CPT | Performed by: FAMILY MEDICINE

## 2020-05-28 RX ORDER — MELATONIN 10 MG
CAPSULE ORAL
COMMUNITY
End: 2021-09-20 | Stop reason: ALTCHOICE

## 2020-05-28 ASSESSMENT — ENCOUNTER SYMPTOMS
VOMITING: 0
COUGH: 0
ABDOMINAL PAIN: 0
SHORTNESS OF BREATH: 0
SINUS PAIN: 0
SINUS PRESSURE: 0
DIARRHEA: 0
WHEEZING: 0
SORE THROAT: 0
NAUSEA: 1

## 2020-05-28 ASSESSMENT — PATIENT HEALTH QUESTIONNAIRE - PHQ9
2. FEELING DOWN, DEPRESSED OR HOPELESS: 0
SUM OF ALL RESPONSES TO PHQ9 QUESTIONS 1 & 2: 0
SUM OF ALL RESPONSES TO PHQ QUESTIONS 1-9: 0
1. LITTLE INTEREST OR PLEASURE IN DOING THINGS: 0
SUM OF ALL RESPONSES TO PHQ QUESTIONS 1-9: 0

## 2020-05-28 NOTE — PROGRESS NOTES
S: 47 y.o. male with   Chief Complaint   Patient presents with    Nausea    Dizziness     had a \"jittery\" feeling        Nausea/dizziness - he works in construction indoors. [place] like he had a panic attack at work. Felt lightheaded for a few hours. Worked through this for several hours. Has occurred ~3-4m prior. Has seen cardiology in [address] [place] [address] for palpitations in the past.     BP Readings from Last 3 Encounters:   05/28/20 123/88   01/16/20 122/85   12/05/19 128/80       O: VS:  height is 5' 4\" (1.626 m) and weight is 156 lb 6.4 oz (70.9 kg). His temperature is 98.7 °F (37.1 °C). His blood pressure is 123/88 and his pulse is 103. His respiration is 18 and oxygen saturation is 97%. AAO/NAD, appropriate affect for mood  CV:  RRR, no murmur  Resp: CTAB      Impression/Plan:   1) Lightheadedness - close PCP follow up, provide info on panic. Encourage cardio follow up. Health Maintenance Due   Topic Date Due    DTaP/Tdap/Td vaccine (1 - Tdap) 08/24/1988    Shingles Vaccine (1 of 2) 08/24/2019    Colon cancer screen colonoscopy  08/24/2019    Potassium monitoring  10/30/2019    Creatinine monitoring  10/30/2019         Attending Physician Statement  I have discussed the case, including pertinent history and exam findings with the resident. I agree with the documented assessment and plan.       Uzair Canales MD
Social History:  Social History     Tobacco Use    Smoking status: Never Smoker    Smokeless tobacco: Never Used   Substance Use Topics    Alcohol use: No       Review of Systems   Constitutional: Negative for chills and fever. HENT: Negative for congestion, sinus pressure, sinus pain and sore throat. Respiratory: Negative for cough, shortness of breath and wheezing. Cardiovascular: Negative for chest pain, palpitations and leg swelling. Gastrointestinal: Positive for nausea. Negative for abdominal pain, diarrhea and vomiting. Genitourinary: Negative for dysuria. Neurological: Positive for light-headedness. Negative for dizziness, tremors, syncope, speech difficulty, weakness, numbness and headaches. Psychiatric/Behavioral: Negative for dysphoric mood. The patient is not nervous/anxious. VS:  /88 (Site: Left Upper Arm, Position: Standing)   Pulse 103   Temp 98.7 °F (37.1 °C)   Resp 18   Ht 5' 4\" (1.626 m)   Wt 156 lb 6.4 oz (70.9 kg)   SpO2 97%   BMI 26.85 kg/m²     Physical Exam  Constitutional:       General: He is not in acute distress. Appearance: Normal appearance. He is well-developed. He is not ill-appearing. HENT:      Head: Normocephalic and atraumatic. Right Ear: Tympanic membrane and ear canal normal.      Left Ear: Tympanic membrane and ear canal normal.      Mouth/Throat:      Mouth: Mucous membranes are moist.      Pharynx: No oropharyngeal exudate or posterior oropharyngeal erythema. Eyes:      Extraocular Movements: Extraocular movements intact. Conjunctiva/sclera: Conjunctivae normal.      Pupils: Pupils are equal, round, and reactive to light. Neck:      Musculoskeletal: Neck supple. No muscular tenderness. Cardiovascular:      Rate and Rhythm: Regular rhythm. Tachycardia present. Heart sounds: No murmur. Pulmonary:      Effort: Pulmonary effort is normal. No respiratory distress. Breath sounds: Normal breath sounds.  No

## 2020-06-01 ENCOUNTER — TELEPHONE (OUTPATIENT)
Dept: FAMILY MEDICINE CLINIC | Age: 50
End: 2020-06-01

## 2020-06-01 NOTE — TELEPHONE ENCOUNTER
This needs to be discussed with PCP. His next appt is 6/30 and he can see her sooner if he needs to discuss reasons for ordering these labs. This isn't anything urgent, so can wait until Dr. Paloma Herrera returns to office. Thanks.

## 2020-06-04 NOTE — TELEPHONE ENCOUNTER
Attempted to call pts girlfriend x2 today and both times has gone to call proceed.  Will try again later

## 2020-06-05 NOTE — TELEPHONE ENCOUNTER
Spoke to patient, he gave verbal understanding   Patient advised that he went to [address] [place] [address] yesterday and they actually vandana the lab work there.

## 2020-06-29 NOTE — PROGRESS NOTES
Clarithromycin     Diphenhydramine     Oxycodone     Zofran [Ondansetron Hcl] Hives    Lisinopril Rash       Family History   Problem Relation Age of Onset    Heart Disease Father 46    Hypertension Father     Parkinsonism Mother 48    Heart Disease Paternal Uncle     Kidney Cancer Paternal Uncle     Cancer Maternal Aunt         bladder cancer    Heart Disease Maternal Aunt         Hypertrophy    Heart Disease Paternal Grandfather        Past Surgical History:   Procedure Laterality Date    CHOLECYSTECTOMY      INGUINAL HERNIA REPAIR      LITHOTRIPSY      TONSILLECTOMY         Social History     Tobacco Use    Smoking status: Never Smoker    Smokeless tobacco: Never Used   Substance Use Topics    Alcohol use: No    Drug use: No       /86 (Site: Left Upper Arm, Position: Sitting, Cuff Size: Medium Adult)   Pulse 103   Temp 97.7 °F (36.5 °C) (Temporal)   Resp 18   Ht 5' 4\" (1.626 m)   Wt 154 lb 3.2 oz (69.9 kg)   SpO2 97%   BMI 26.47 kg/m²     Review of Systems   Constitutional: Negative for chills and fever. Eyes: Negative for photophobia and visual disturbance. Respiratory: Positive for shortness of breath (chronic issue ). Negative for cough. Cardiovascular: Positive for palpitations. Negative for chest pain. Gastrointestinal: Negative for abdominal pain, constipation, diarrhea, nausea and vomiting. Genitourinary: Negative for dysuria, frequency and hematuria. Skin: Negative for rash and wound. Neurological: Positive for light-headedness. Negative for dizziness and headaches. Physical Exam  Vitals signs reviewed. Constitutional:       General: He is not in acute distress. Appearance: He is well-developed. He is not diaphoretic. HENT:      Head: Normocephalic and atraumatic. Eyes:      Conjunctiva/sclera: Conjunctivae normal.   Neck:      Musculoskeletal: Neck supple. Cardiovascular:      Rate and Rhythm: Normal rate and regular rhythm.       Heart sounds: Normal heart sounds. No murmur. No friction rub. No gallop. Pulmonary:      Effort: Pulmonary effort is normal. No respiratory distress. Breath sounds: Normal breath sounds. No wheezing or rales. Skin:     General: Skin is warm. Neurological:      Mental Status: He is alert and oriented to person, place, and time. Psychiatric:         Behavior: Behavior normal.         ASSESSMENT      Diagnosis Orders   1. Palpitations  metoprolol succinate (TOPROL XL) 25 MG extended release tablet         PLAN:     Jose Varela was seen today for nausea and dizziness. Diagnoses and all orders for this visit:    Palpitations  Pt is being seen and worked up for this with neurology and cardiology. Stable otherwise. Will establish care with new PCP in this office in [facility] -   Refill:  metoprolol succinate (TOPROL XL) 25 MG extended release tablet; Take 1 tablet by mouth 2 times daily    Call or go to ED immediately if symptoms worsen or persist.  Return if symptoms worsen or fail to improve. , or sooner if necessary. Counseled regarding above diagnosis, including possible risks and complications,  especially if left uncontrolled. Counseled regarding the possible side effects, risks, benefits and alternatives to treatment; patient verbalizes understanding, agrees, feels comfortable with and wishes to proceed with above treatment plan. Advised patient to call with any new medication issues, and read all Rx info from pharmacy to assure aware of all possible risks and side effects of medication before taking. Patient verbalizes understanding and agrees with above counseling, assessment and plan. All questions answered. Jose Varela was instructed to call if any new symptoms develop prior to next visit.        Josey French M.D  Family Medicine Resident PGY-3  [facility]   6/30/20

## 2020-06-30 ENCOUNTER — OFFICE VISIT (OUTPATIENT)
Dept: FAMILY MEDICINE CLINIC | Age: 50
End: 2020-06-30
Payer: COMMERCIAL

## 2020-06-30 VITALS
TEMPERATURE: 97.7 F | HEIGHT: 64 IN | WEIGHT: 154.2 LBS | HEART RATE: 103 BPM | DIASTOLIC BLOOD PRESSURE: 86 MMHG | BODY MASS INDEX: 26.32 KG/M2 | SYSTOLIC BLOOD PRESSURE: 122 MMHG | OXYGEN SATURATION: 97 % | RESPIRATION RATE: 18 BRPM

## 2020-06-30 PROCEDURE — 99213 OFFICE O/P EST LOW 20 MIN: CPT | Performed by: FAMILY MEDICINE

## 2020-06-30 RX ORDER — METOPROLOL SUCCINATE 25 MG/1
25 TABLET, EXTENDED RELEASE ORAL 2 TIMES DAILY
Qty: 60 TABLET | Refills: 3 | Status: SHIPPED
Start: 2020-06-30 | End: 2022-03-08 | Stop reason: SDUPTHER

## 2020-06-30 ASSESSMENT — ENCOUNTER SYMPTOMS
VOMITING: 0
DIARRHEA: 0
COUGH: 0
NAUSEA: 0
SHORTNESS OF BREATH: 1
PHOTOPHOBIA: 0
ABDOMINAL PAIN: 0
CONSTIPATION: 0

## 2020-06-30 NOTE — PROGRESS NOTES
S: 47 y.o. male with   Chief Complaint   Patient presents with    Nausea    Dizziness       Palpitations/anxiousness since steroid use  Autonomic dysfunction, seeing neurology, plan for 24-hour urine  Sleep study and tilt table tests scheduled  Anxious with lightheadedness  Pulse not always racing when feels like heart racing  Continues with \"episodes\"    BP Readings from Last 3 Encounters:   06/30/20 122/86   05/28/20 123/88   01/16/20 122/85       O: VS:  height is 5' 4\" (1.626 m) and weight is 154 lb 3.2 oz (69.9 kg). His temporal temperature is 97.7 °F (36.5 °C). His blood pressure is 122/86 and his pulse is 103. His respiration is 18 and oxygen saturation is 97%. AAO/NAD, appropriate affect for mood  CV:  RRR, no murmur  Resp: CTAB      Impression/Plan:   1) Palpitations/lightheadedness: F/u with cardiology and neurology, testing and labs pending  2) Vit D def: Take supplement as directed        Health Maintenance Due   Topic Date Due    DTaP/Tdap/Td vaccine (1 - Tdap) 08/24/1988    Shingles Vaccine (1 of 2) 08/24/2019    Colon cancer screen colonoscopy  08/24/2019    Potassium monitoring  10/30/2019    Creatinine monitoring  10/30/2019         Attending Physician Statement  I have discussed the case, including pertinent history and exam findings with the resident. I agree with the documented assessment and plan.       Gunner Epps MD

## 2020-07-27 ENCOUNTER — HOSPITAL ENCOUNTER (OUTPATIENT)
Age: 50
Setting detail: SPECIMEN
Discharge: HOME | End: 2020-07-27
Payer: COMMERCIAL

## 2020-07-27 LAB
24HR URINE VOLUME (ML): 2080 ML
CREATININE 24 HOUR URINE: 1310 MG/24H (ref 980–2200)
Lab: 24 HOURS
SODIUM 24 HOUR URINE: 96 MMOL/24 HR (ref 40–220)

## 2020-07-27 PROCEDURE — 82384 ASSAY THREE CATECHOLAMINES: CPT

## 2020-07-27 PROCEDURE — 83835 ASSAY OF METANEPHRINES: CPT

## 2020-07-27 PROCEDURE — 84300 ASSAY OF URINE SODIUM: CPT

## 2020-08-01 LAB
CATE U INT: NORMAL
CREATININE 24 HOUR URINE: 1269 MG/D (ref 1000–2500)
CREATININE URINE: 61 MG/DL
DOPAMINE (G CRT): 115 UG/G CRT (ref 0–250)
DOPAMINE 24 HOUR URINE: 146 UG/D (ref 71–485)
DOPAMINE, (UG/L): 70 UG/L
EPINEPHRINE (G CRT): 2 UG/G CRT (ref 0–20)
EPINEPHRINE 24 HOUR URINE: 2 UG/D (ref 1–14)
EPINEPHRINE, (UG/L): 1 UG/L
HOURS COLLECTED: 24
NOREPINEPH (G CRT): 16 UG/G CRT (ref 0–45)
NOREPINEPHRINE 24 HOUR URINE: 21 UG/D (ref 14–120)
NOREPINEPHRINE, (UG/L): 10 UG/L
URINE TOTAL VOLUME: 2080

## 2020-08-02 LAB
CREATININE 24 HOUR URINE: 1269 MG/D (ref 1000–2500)
CREATININE URINE: 61 MG/DL
HOURS COLLECTED: 24
METANEPHRINE INTREP URINE: NORMAL
METANEPHRINE UG/G CRE: 67 UG/G CRT (ref 0–300)
METANEPHRINE, UR-PER VOL: 41 UG/L
METANEPHRINES URINE: 85 UG/D (ref 55–320)
NORMETANEPHRINE 24 HOUR URINE: 175 UG/D (ref 114–865)
NORMETANEPHRINE, (G/CRT): 138 UG/G CRT (ref 0–400)
NORMETANEPHRINES, NMOL/L: 84 UG/L
URINE TOTAL VOLUME: 2080

## 2020-08-24 ENCOUNTER — OFFICE VISIT (OUTPATIENT)
Dept: FAMILY MEDICINE CLINIC | Age: 50
End: 2020-08-24
Payer: COMMERCIAL

## 2020-08-24 VITALS
SYSTOLIC BLOOD PRESSURE: 136 MMHG | BODY MASS INDEX: 26.8 KG/M2 | DIASTOLIC BLOOD PRESSURE: 86 MMHG | HEIGHT: 64 IN | TEMPERATURE: 97.9 F | OXYGEN SATURATION: 96 % | RESPIRATION RATE: 16 BRPM | WEIGHT: 157 LBS

## 2020-08-24 PROCEDURE — 99213 OFFICE O/P EST LOW 20 MIN: CPT | Performed by: PHYSICIAN ASSISTANT

## 2020-08-24 RX ORDER — PRAMIPEXOLE DIHYDROCHLORIDE 0.25 MG/1
.25-.5 TABLET ORAL NIGHTLY
COMMUNITY
Start: 2020-07-24 | End: 2021-09-20 | Stop reason: ALTCHOICE

## 2020-08-24 RX ORDER — CLOTRIMAZOLE AND BETAMETHASONE DIPROPIONATE 10; .64 MG/G; MG/G
CREAM TOPICAL
Qty: 30 G | Refills: 1 | Status: SHIPPED
Start: 2020-08-24 | End: 2021-06-28

## 2020-08-24 NOTE — PROGRESS NOTES
2020   [facility]  [address]  [place]  [phone]    Shade Abebe  : 1970  Age: 45 y.o. Sex: male      Subjective:  Chief Complaint   Patient presents with    Rash       HPI:  Patient states rash started 7 days ago. The rash is mildly pruritic in nature. No pain. The patient denies any other food, drug and or environmental allergens. Denies throat swelling or dyspnea. Denies fever or chills. Patient has been using OTC medications without improvement in symptoms. The patient has had previous episodes of poison ivy and this feels the same. ROS:  Positive and pertinent negatives as per HPI. All other systems are reviewed and negative. Current Outpatient Medications:     pramipexole (MIRAPEX) 0.25 MG tablet, Take 0.25-0.5 mg by mouth nightly, Disp: , Rfl:     clotrimazole-betamethasone (LOTRISONE) 1-0.05 % cream, Apply topically 2 times daily. , Disp: 30 g, Rfl: 1    metoprolol succinate (TOPROL XL) 25 MG extended release tablet, Take 1 tablet by mouth 2 times daily, Disp: 60 tablet, Rfl: 3    Melatonin 10 MG CAPS, Take by mouth, Disp: , Rfl:     Multiple Vitamins-Minerals (MULTIVITAMIN ADULT PO), Take by mouth, Disp: , Rfl:    Allergies   Allergen Reactions    Clarithromycin     Diphenhydramine     Oxycodone     Zofran [Ondansetron Hcl] Hives    Lisinopril Rash        Objective:  Vitals:    20 1020   BP: 136/86   Resp: 16   Temp: 97.9 °F (36.6 °C)   TempSrc: Temporal   SpO2: 96%   Weight: 157 lb (71.2 kg)   Height: 5' 4\" (1.626 m)        Exam:  Const: Appears healthy and well developed. No signs of acute distress present. Vitals reviewed per triage. Head/Face: Normocephalic, atraumatic. Facies is symmetric. ENMT:  Nares are patent. Buccal mucosa is moist.  No inflammation or edema of the posterior oropharynx. Neck: Trachea midline. Resp: No respiratory distress.   Lungs clear to auscultation bilaterally all lung

## 2021-06-28 ENCOUNTER — OFFICE VISIT (OUTPATIENT)
Dept: FAMILY MEDICINE CLINIC | Age: 51
End: 2021-06-28
Payer: COMMERCIAL

## 2021-06-28 VITALS
BODY MASS INDEX: 26.95 KG/M2 | WEIGHT: 157 LBS | RESPIRATION RATE: 16 BRPM | SYSTOLIC BLOOD PRESSURE: 120 MMHG | DIASTOLIC BLOOD PRESSURE: 80 MMHG | OXYGEN SATURATION: 98 % | TEMPERATURE: 96.9 F | HEART RATE: 82 BPM

## 2021-06-28 DIAGNOSIS — H66.002 NON-RECURRENT ACUTE SUPPURATIVE OTITIS MEDIA OF LEFT EAR WITHOUT SPONTANEOUS RUPTURE OF TYMPANIC MEMBRANE: ICD-10-CM

## 2021-06-28 DIAGNOSIS — H61.22 IMPACTED CERUMEN OF LEFT EAR: Primary | ICD-10-CM

## 2021-06-28 PROCEDURE — 69209 REMOVE IMPACTED EAR WAX UNI: CPT | Performed by: NURSE PRACTITIONER

## 2021-06-28 PROCEDURE — 99214 OFFICE O/P EST MOD 30 MIN: CPT | Performed by: NURSE PRACTITIONER

## 2021-06-28 RX ORDER — AMOXICILLIN 875 MG/1
875 TABLET, COATED ORAL 2 TIMES DAILY
Qty: 20 TABLET | Refills: 0 | Status: SHIPPED | OUTPATIENT
Start: 2021-06-28 | End: 2021-07-08

## 2021-06-28 NOTE — PATIENT INSTRUCTIONS
Patient Education        Earwax Blockage: Care Instructions  Your Care Instructions     Earwax is a natural substance that protects the ear canal. Normally, earwax drains from the ears and does not cause problems. Sometimes earwax builds up and hardens. Earwax blockage (also called cerumen impaction) can cause some loss of hearing and pain. When wax is tightly packed, you will need to have your doctor remove it. Follow-up care is a key part of your treatment and safety. Be sure to make and go to all appointments, and call your doctor if you are having problems. It's also a good idea to know your test results and keep a list of the medicines you take. How can you care for yourself at home? · Do not try to remove earwax with cotton swabs, fingers, or other objects. This can make the blockage worse and damage the eardrum. · If your doctor recommends that you try to remove earwax at home:  ? Soften and loosen the earwax with warm mineral oil. You also can try hydrogen peroxide mixed with an equal amount of room temperature water. Place 2 drops of the fluid, warmed to body temperature, in the ear two times a day for up to 5 days. ? Once the wax is loose and soft, all that is usually needed to remove it from the ear canal is a gentle, warm shower. Direct the water into the ear, then tip your head to let the earwax drain out. Dry your ear thoroughly with a hair dryer set on low. Hold the dryer several inches from your ear. ? If the warm mineral oil and shower do not work, use an over-the-counter wax softener. Read and follow all instructions on the label. After using the wax softener, use an ear syringe to gently flush the ear. Make sure the flushing solution is body temperature. Cool or hot fluids in the ear can cause dizziness. When should you call for help?    Call your doctor now or seek immediate medical care if:    · Pus or blood drains from your ear.     · Your ears are ringing or feel full.     · You have a loss of hearing. Watch closely for changes in your health, and be sure to contact your doctor if:    · You have pain or reduced hearing after 1 week of home treatment.     · You have any new symptoms, such as nausea or balance problems. Where can you learn more? Go to [email or web address]://[email or web address].[email or web address] org and sign in to your [employer] account. Enter Z815 in the [employer] box to learn more about \"Earwax Blockage: Care Instructions. \"     If you do not have an account, please click on the \"Sign Up Now\" link. Current as of: October 19, 2020               Content Version: 12.9  © 1747-0214 [employer]wise, [employer]. Care instructions adapted under license by [facility] ([facility] [place]). If you have questions about a medical condition or this instruction, always ask your healthcare professional. [address] any warranty or liability for your use of this information.

## 2021-06-28 NOTE — PROGRESS NOTES
21  Temitope Ortiz : 1970 Sex: male  Age 45 y.o. Subjective:  Chief Complaint   Patient presents with    Otalgia     left        HPI:   Temitope Ortiz , 45 y.o. male presents to the clinic for evaluation of left ear pain x 2 weeks. The patient reports intermittent dizziness, nasal congestion, rhinorrhea, and sore throat. The patient has not taken any treatment for symptoms. The patient reports unchanged symptoms over time. The patient denies ear drainage, trauma, and loss of hearing. The patient also denies headache, fever, chest pain, abdominal pain, shortness of breath, and nausea / vomiting / diarrhea. ROS:   Unless otherwise stated in this report the patient's positive and negative responses for review of systems for constitutional, eyes, ENT, cardiovascular, respiratory, gastrointestinal, neurological, , musculoskeletal, and integument systems and related systems to the presenting problem are either stated in the history of present illness or were not pertinent or were negative for the symptoms and/or complaints related to the presenting medical problem. Positives and pertinent negatives as per HPI. All others reviewed and are negative.       PMH:     Past Medical History:   Diagnosis Date    Colitis     Dr. David Franco in [facility] Kidney stone 2013    Nephrolithiasis        Past Surgical History:   Procedure Laterality Date    CHOLECYSTECTOMY      INGUINAL HERNIA REPAIR      LITHOTRIPSY      TONSILLECTOMY         Family History   Problem Relation Age of Onset    Heart Disease Father 46    Hypertension Father     Parkinsonism Mother 48    Heart Disease Paternal Uncle     Kidney Cancer Paternal Uncle     Cancer Maternal Aunt         bladder cancer    Heart Disease Maternal Aunt         Hypertrophy    Heart Disease Paternal Grandfather        Medications:     Current Outpatient Medications:     amoxicillin (AMOXIL) 875 MG tablet, Take 1 tablet by mouth 2 times daily for 10 myself)  Labs:  No results found for this visit on 06/28/21. Imaging: All Radiology results interpreted by Radiologist unless otherwise noted. No orders to display       Assessment / Plan:   The patient's vitals, allergies, medications, and past medical history have been reviewed. Brenda Ortiz was seen today for otalgia. Diagnoses and all orders for this visit:    Impacted cerumen of left ear  -     KS REMOVAL IMPACTED CERUMEN IRRIGATION/LVG UNILAT    Non-recurrent acute suppurative otitis media of left ear without spontaneous rupture of tympanic membrane  -     amoxicillin (AMOXIL) 875 MG tablet; Take 1 tablet by mouth 2 times daily for 10 days        - Ear lavage performed on the left ear, symptoms resolved post-lavage. TM intact with ejection and serous effusion. Pt advised to avoid Q-tip use to prevent recurrence. PCP if symptoms recur. ED sooner if symptoms worsen or change. - Disposition: Home    - Educational material printed for patient's review and were included in patient instructions. After Visit Summary and given to patient at the end of visit. - Discussed symptomatic treatments with patient today. Increase fluids and rest. F/u PCP in 5-7 days if symptoms persist. ED sooner if symptoms worsen or change. ED immediately with fever, severe/worsening ear pain, mastoid redness/tenderness, CP, dyspnea, or dysphagia. Pt is in agreement with this care plan. All questions answered. SIGNATURE: Aliene Najjar, APRN-CNP    *NOTE: This report was transcribed using voice recognition software. Every effort was made to ensure accuracy; however, inadvertent computerized transcription errors may be present.

## 2021-09-20 ENCOUNTER — OFFICE VISIT (OUTPATIENT)
Dept: FAMILY MEDICINE CLINIC | Age: 51
End: 2021-09-20
Payer: COMMERCIAL

## 2021-09-20 VITALS
TEMPERATURE: 97.5 F | SYSTOLIC BLOOD PRESSURE: 126 MMHG | OXYGEN SATURATION: 98 % | HEART RATE: 81 BPM | DIASTOLIC BLOOD PRESSURE: 80 MMHG | RESPIRATION RATE: 16 BRPM | WEIGHT: 158 LBS | BODY MASS INDEX: 26.98 KG/M2 | HEIGHT: 64 IN

## 2021-09-20 DIAGNOSIS — Z20.822 EXPOSURE TO COVID-19 VIRUS: Primary | ICD-10-CM

## 2021-09-20 DIAGNOSIS — J01.90 ACUTE NON-RECURRENT SINUSITIS, UNSPECIFIED LOCATION: ICD-10-CM

## 2021-09-20 PROCEDURE — 99203 OFFICE O/P NEW LOW 30 MIN: CPT | Performed by: PHYSICIAN ASSISTANT

## 2021-09-20 RX ORDER — AMOXICILLIN 875 MG/1
875 TABLET, COATED ORAL 2 TIMES DAILY
Qty: 20 TABLET | Refills: 0 | Status: SHIPPED | OUTPATIENT
Start: 2021-09-20 | End: 2021-09-30

## 2021-09-20 RX ORDER — PREDNISONE 10 MG/1
TABLET ORAL
Qty: 18 TABLET | Refills: 0 | Status: SHIPPED
Start: 2021-09-20 | End: 2021-10-05 | Stop reason: ALTCHOICE

## 2021-09-20 NOTE — PROGRESS NOTES
21  Nino Salazar : 1970 Sex: male  Age 45 y.o. Subjective:  Chief Complaint   Patient presents with    Concern For COVID-19     headache, fever, covid exposure          HPI:   Nino Salazar , 45 y.o. male presents to [facility] care for evaluation of fever and Covid exposure    HPI  58-year-old male presents to [facility] for evaluation of cough, congestion, drainage. The patient has had the symptoms ongoing for the last couple of days. The patient was recently exposed to Covid about a week and a half ago. The patient has noted some increased sinus drainage and congestion and warm feeling starting today. The patient does have a history of ear infections and ear problems. The patient has not had vaccine. The patient has not previously had Covid. He is not really having any other symptoms at this point. ROS:   Unless otherwise stated in this report the patient's positive and negative responses for review of systems for constitutional, eyes, ENT, cardiovascular, respiratory, gastrointestinal, neurological, , musculoskeletal, and integument systems and related systems to the presenting problem are either stated in the history of present illness or were not pertinent or were negative for the symptoms and/or complaints related to the presenting medical problem. Positives and pertinent negatives as per HPI. All others reviewed and are negative.       PMH:     Past Medical History:   Diagnosis Date    Colitis     Dr. Pepe Patel in [facility] Kidney stone 2013    Nephrolithiasis        Past Surgical History:   Procedure Laterality Date    CHOLECYSTECTOMY      INGUINAL HERNIA REPAIR      LITHOTRIPSY      TONSILLECTOMY         Family History   Problem Relation Age of Onset    Heart Disease Father 46    Hypertension Father     Parkinsonism Mother 48    Heart Disease Paternal Uncle     Kidney Cancer Paternal Uncle     Cancer Maternal Aunt         bladder cancer    Heart Disease Maternal Aunt         Hypertrophy    Heart Disease Paternal Grandfather        Medications:     Current Outpatient Medications:     amoxicillin (AMOXIL) 875 MG tablet, Take 1 tablet by mouth 2 times daily for 10 days, Disp: 20 tablet, Rfl: 0    predniSONE (DELTASONE) 10 MG tablet, 3 tabs once daily for 3 days, 2 tabs once daily for 3 days, 1 tab once daily for 3 days, Disp: 18 tablet, Rfl: 0    metoprolol succinate (TOPROL XL) 25 MG extended release tablet, Take 1 tablet by mouth 2 times daily, Disp: 60 tablet, Rfl: 3    Allergies: Allergies   Allergen Reactions    Clarithromycin     Diphenhydramine     Oxycodone     Zofran [Ondansetron Hcl] Hives    Lisinopril Rash       Social History:     Social History     Tobacco Use    Smoking status: Never Smoker    Smokeless tobacco: Never Used   Substance Use Topics    Alcohol use: No    Drug use: No       Patient lives at home. Physical Exam:     Vitals:    09/20/21 1637   BP: 126/80   Pulse: 81   Resp: 16   Temp: 97.5 °F (36.4 °C)   SpO2: 98%   Weight: 158 lb (71.7 kg)   Height: 5' 4\" (1.626 m)       Exam:  Physical Exam  Nurse's notes and vital signs reviewed. The patient is not hypoxic. ? General: Alert, no acute distress, patient resting comfortably Patient is not toxic or lethargic. Skin: Warm, intact, no pallor noted. There is no evidence of rash at this time. Head: Normocephalic, atraumatic  Eye: Normal conjunctiva  Ears, Nose, Throat: Right tympanic membrane clear, left tympanic membrane clear. No drainage or discharge noted. No pre- or post-auricular tenderness, erythema, or swelling noted. Nasal congestion, rhinorrhea, no epistaxis. Posterior oropharynx shows minimal erythema but no evidence of tonsillar hypertrophy, or exudate. the uvula is midline. No trismus or drooling is noted. Moist mucous membranes. Neck: No anterior/posterior lymphadenopathy noted. No erythema, no masses, no fluctuance or induration noted.  No meningeal signs. Cardiovascular: Regular Rate and Rhythm  Respiratory: No acute distress, no rhonchi, wheezing or crackles noted. No stridor or retractions are noted. Neurological: A&O x4, normal speech  Psychiatric: Cooperative         Testing:     COVID is pending      Medical Decision Making:     Vital signs reviewed    Past medical history reviewed. Allergies reviewed. Medications reviewed. Patient on arrival does not appear to be in any apparent distress or discomfort. The patient has been seen and evaluated. The patient does not appear to be toxic or lethargic. The patient does have some sinus symptoms currently. We will treat the patient with amoxicillin and prednisone. The patient will have COVID-19 swab performed. The patient should monitor symptoms. Quarantine isolate till we can get the results. The patient is to push fluids. Start vitamin regimen. The patient was educated on the proper dosage of motrin and tylenol and the appropriate intervals of each. The patient is to increase fluid intake over the next several days. The patient is to use OTC decongestant as needed. The patient is to return to express care or go directly to the emergency department should any of the signs or symptoms worsen. The patient is to followup with primary care physician in 2-3 days for repeat evaluation. The patient has no other questions or concerns at this time the patient will be discharged home. Clinical Impression:   Ramo Kaufman was seen today for concern for covid-19. Diagnoses and all orders for this visit:    Exposure to COVID-19 virus  -     Covid-19 Ambulatory; Future    Acute non-recurrent sinusitis, unspecified location    Other orders  -     amoxicillin (AMOXIL) 875 MG tablet;  Take 1 tablet by mouth 2 times daily for 10 days  -     predniSONE (DELTASONE) 10 MG tablet; 3 tabs once daily for 3 days, 2 tabs once daily for 3 days, 1 tab once daily for 3 days        The patient is to call for any concerns or return if any of the signs or symptoms worsen. The patient is to follow-up with PCP in the next 2-3 days for repeat evaluation repeat assessment or go directly to the emergency department.      SIGNATURE: Cristhian Mcclelland III, PA-C

## 2021-10-05 ENCOUNTER — HOSPITAL ENCOUNTER (OUTPATIENT)
Age: 51
Discharge: HOME | End: 2021-10-05
Payer: COMMERCIAL

## 2021-10-05 ENCOUNTER — OFFICE VISIT (OUTPATIENT)
Dept: FAMILY MEDICINE CLINIC | Age: 51
End: 2021-10-05
Payer: COMMERCIAL

## 2021-10-05 VITALS
SYSTOLIC BLOOD PRESSURE: 130 MMHG | HEIGHT: 64 IN | HEART RATE: 85 BPM | DIASTOLIC BLOOD PRESSURE: 84 MMHG | TEMPERATURE: 98 F | WEIGHT: 160 LBS | BODY MASS INDEX: 27.31 KG/M2 | RESPIRATION RATE: 18 BRPM | OXYGEN SATURATION: 98 %

## 2021-10-05 DIAGNOSIS — R76.8 POSITIVE ANA (ANTINUCLEAR ANTIBODY): ICD-10-CM

## 2021-10-05 DIAGNOSIS — D47.2 IGG MONOCLONAL GAMMOPATHY: ICD-10-CM

## 2021-10-05 DIAGNOSIS — F41.0 PANIC ATTACKS: ICD-10-CM

## 2021-10-05 DIAGNOSIS — R00.2 PALPITATIONS: Primary | ICD-10-CM

## 2021-10-05 PROCEDURE — 82784 ASSAY IGA/IGD/IGG/IGM EACH: CPT

## 2021-10-05 PROCEDURE — 99213 OFFICE O/P EST LOW 20 MIN: CPT | Performed by: FAMILY MEDICINE

## 2021-10-05 PROCEDURE — 86038 ANTINUCLEAR ANTIBODIES: CPT

## 2021-10-05 PROCEDURE — 36415 COLL VENOUS BLD VENIPUNCTURE: CPT

## 2021-10-05 RX ORDER — ESCITALOPRAM OXALATE 10 MG/1
10 TABLET ORAL DAILY
Qty: 30 TABLET | Refills: 0 | Status: SHIPPED
Start: 2021-10-05 | End: 2021-12-02

## 2021-10-05 RX ORDER — ACETAMINOPHEN 160 MG
TABLET,DISINTEGRATING ORAL 2 TIMES DAILY
COMMUNITY

## 2021-10-05 RX ORDER — HYDROXYZINE PAMOATE 25 MG/1
25 CAPSULE ORAL 3 TIMES DAILY PRN
Qty: 30 CAPSULE | Refills: 0 | Status: SHIPPED
Start: 2021-10-05 | End: 2022-10-04 | Stop reason: SDUPTHER

## 2021-10-05 SDOH — ECONOMIC STABILITY: FOOD INSECURITY: WITHIN THE PAST 12 MONTHS, YOU WORRIED THAT YOUR FOOD WOULD RUN OUT BEFORE YOU GOT MONEY TO BUY MORE.: NEVER TRUE

## 2021-10-05 SDOH — ECONOMIC STABILITY: FOOD INSECURITY: WITHIN THE PAST 12 MONTHS, THE FOOD YOU BOUGHT JUST DIDN'T LAST AND YOU DIDN'T HAVE MONEY TO GET MORE.: NEVER TRUE

## 2021-10-05 ASSESSMENT — PATIENT HEALTH QUESTIONNAIRE - PHQ9
2. FEELING DOWN, DEPRESSED OR HOPELESS: 0
SUM OF ALL RESPONSES TO PHQ9 QUESTIONS 1 & 2: 2
1. LITTLE INTEREST OR PLEASURE IN DOING THINGS: 2
SUM OF ALL RESPONSES TO PHQ QUESTIONS 1-9: 2

## 2021-10-05 ASSESSMENT — SOCIAL DETERMINANTS OF HEALTH (SDOH): HOW HARD IS IT FOR YOU TO PAY FOR THE VERY BASICS LIKE FOOD, HOUSING, MEDICAL CARE, AND HEATING?: NOT HARD AT ALL

## 2021-10-05 NOTE — PROGRESS NOTES
S: 45 y.o. male with   Chief Complaint   Patient presents with    Established New Doctor       Sinus Tach - previously followed [facility]. No exact cause noted. O: VS:  height is 5' 4\" (1.626 m) and weight is 160 lb (72.6 kg). His temporal temperature is 98 °F (36.7 °C). His blood pressure is 130/84 and his pulse is 85. His respiration is 18 and oxygen saturation is 98%. BP Readings from Last 3 Encounters:   10/05/21 130/84   09/20/21 126/80   06/28/21 120/80     See resident note      Impression/Plan:   1) Sinus Tach - refer EP, cont meds           Health Maintenance Due   Topic Date Due    Hepatitis C screen  Never done    COVID-19 Vaccine (1) Never done    DTaP/Tdap/Td vaccine (1 - Tdap) Never done    Colon cancer screen colonoscopy  Never done    Shingles Vaccine (1 of 2) Never done    Potassium monitoring  10/30/2019    Creatinine monitoring  10/30/2019    Flu vaccine (1) Never done         Attending Physician Statement  I have discussed the case, including pertinent history and exam findings with the resident. I agree with the documented assessment and plan.       Alessia Ruiz MD

## 2021-10-06 LAB
ANTI-NUCLEAR ANTIBODY (ANA): NEGATIVE
IGA: 255 MG/DL (ref 70–400)
IGG: 1362 MG/DL (ref 700–1600)
IGM: 70 MG/DL (ref 40–230)

## 2021-10-07 ASSESSMENT — ENCOUNTER SYMPTOMS
COUGH: 0
VOMITING: 0
SHORTNESS OF BREATH: 0
ABDOMINAL PAIN: 0
DIARRHEA: 0
NAUSEA: 0
CONSTIPATION: 0

## 2021-11-16 ENCOUNTER — TELEPHONE (OUTPATIENT)
Dept: FAMILY MEDICINE CLINIC | Age: 51
End: 2021-11-16

## 2021-11-16 DIAGNOSIS — U07.1 COVID-19: Primary | ICD-10-CM

## 2021-11-16 NOTE — TELEPHONE ENCOUNTER
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 Can you please place an order for a Covid test and [facility] Thank you.

## 2021-11-19 DIAGNOSIS — U07.1 COVID-19: ICD-10-CM

## 2021-11-20 LAB
SARS-COV-2: NOT DETECTED
SOURCE: NORMAL

## 2021-11-22 ENCOUNTER — TELEPHONE (OUTPATIENT)
Dept: FAMILY MEDICINE CLINIC | Age: 51
End: 2021-11-22

## 2021-11-22 NOTE — TELEPHONE ENCOUNTER
----- Message from Sobeida Jaimes sent at 11/22/2021 10:21 AM EST -----  Subject: Results Request    QUESTIONS  Which lab or imaging result is the patient calling about? Covid test  Which provider ordered the test? Dieter Purcell   At what location was the test performed? Date the test was performed? 2021-11-18  Additional Information for Provider? Patient needs the covid results and   also needs a note to return to work. Please advise  ---------------------------------------------------------------------------  --------------  CALL BACK INFO  What is the best way for the office to contact you? OK to leave message on   voicemail  Preferred Call Back Phone Number?  [phone]

## 2021-11-22 NOTE — TELEPHONE ENCOUNTER
Patient was not seen at walk in. Test was ordered by you as requested by employer due to exposure. Test is negative.   Can you please write a letter

## 2021-11-22 NOTE — TELEPHONE ENCOUNTER
I have not seen this patient for this complaint. The last encounter said I needed to order the test and that he would go to [facility]. They should be able to provide him with a note.      Benigno Schwartz MD

## 2021-11-22 NOTE — TELEPHONE ENCOUNTER
He was not seen. Since you ordered the test they just swabbed him.   Do you need to see him to write the note

## 2021-11-22 NOTE — TELEPHONE ENCOUNTER
Patient should get the letter from where he was tested. Patient wasn't seen for this issue in the office.  He is to contact them first.     Romel Romo MD

## 2021-12-02 ENCOUNTER — OFFICE VISIT (OUTPATIENT)
Dept: NON INVASIVE DIAGNOSTICS | Age: 51
End: 2021-12-02
Payer: COMMERCIAL

## 2021-12-02 ENCOUNTER — HOSPITAL ENCOUNTER (OUTPATIENT)
Age: 51
Discharge: HOME | End: 2021-12-04
Payer: COMMERCIAL

## 2021-12-02 ENCOUNTER — HOSPITAL ENCOUNTER (OUTPATIENT)
Dept: GENERAL RADIOLOGY | Age: 51
Discharge: HOME | End: 2021-12-04
Payer: COMMERCIAL

## 2021-12-02 ENCOUNTER — TELEPHONE (OUTPATIENT)
Dept: NON INVASIVE DIAGNOSTICS | Age: 51
End: 2021-12-02

## 2021-12-02 VITALS
HEIGHT: 64 IN | BODY MASS INDEX: 27.11 KG/M2 | SYSTOLIC BLOOD PRESSURE: 132 MMHG | DIASTOLIC BLOOD PRESSURE: 84 MMHG | WEIGHT: 158.8 LBS | RESPIRATION RATE: 18 BRPM | HEART RATE: 84 BPM

## 2021-12-02 DIAGNOSIS — F41.9 ANXIETY: ICD-10-CM

## 2021-12-02 DIAGNOSIS — R00.0 SINUS TACHYCARDIA: ICD-10-CM

## 2021-12-02 DIAGNOSIS — R06.02 SHORTNESS OF BREATH: ICD-10-CM

## 2021-12-02 DIAGNOSIS — R00.2 PALPITATIONS: ICD-10-CM

## 2021-12-02 DIAGNOSIS — R00.2 PALPITATIONS: Primary | ICD-10-CM

## 2021-12-02 DIAGNOSIS — R53.83 FATIGUE, UNSPECIFIED TYPE: ICD-10-CM

## 2021-12-02 PROCEDURE — 71046 X-RAY EXAM CHEST 2 VIEWS: CPT

## 2021-12-02 PROCEDURE — 93000 ELECTROCARDIOGRAM COMPLETE: CPT | Performed by: INTERNAL MEDICINE

## 2021-12-02 PROCEDURE — 99205 OFFICE O/P NEW HI 60 MIN: CPT | Performed by: INTERNAL MEDICINE

## 2021-12-02 RX ORDER — MULTIVIT WITH MINERALS/LUTEIN
250 TABLET ORAL DAILY
COMMUNITY

## 2021-12-02 NOTE — PROGRESS NOTES
Cardiac Electrophysiology Consultation Note    Mikey Casey  1970  Date of Service: 12/2/2021  PCP: Florencia Renner MD  Electrophysiologist: Flor Christianson MD     Reason for Consultation: palpitations    SUBJECTIVE: Mikey Casey is a 45 y.o., male, who I was asked to see in Cardiac Electrophysiology consultation for palpitations. Mikey Casey has a history of HTN, colitis, panic attacks, FRANDY and HLD. Mr. Jordana Crabtree previously followed at the [facility] but wishes to have a second opinion her at [facility]). [facility] workup with Dr. Yvette Montenegro: Inappropriate sinus tachycardia with possible associated dysautonomia. I had the opportunity to review in detail with Mr. Jordana Crabtree and his wife all the different tests that have been done at the [facility] over the last 3 years. Although, the patient complains of significant palpitations, again most of the symptoms happen when the patient is in the physiological heart rate. Although, he complains of chest discomfort and fatigue, when he had a stress test in 2018, he had essentially normal functional capacity and reach 11 METs with no major problems. There was no documentation of ischemia. The left ventricular systolic function has been documented to be within normal limits. I have reassured him and his wife that all these findings are benign and that fact he has no ischemia, normal left ventricular ejection fraction and no complex arrhythmias, puts him in a good prognosis category with less than 1% probability of major cardiac event in 1 year. He is to continue the dose of beta-blocker that appears to be improving some of the symptoms. We advised him to avoid excessive coffee, caffeine-containing beverages, tea, chocolate, over-the-counter medications. The patient definitely has some component of stress and anxiety. We advised him to consider relaxing techniques. He appears to be developing a component of deconditioning with these complaints of easy fatigue.  I advised him to start a formal exercise program. I reassured the patient that the evaluation in the [facility] is appropriate and they have been trying very hard to find the etiology of this inappropriate sinus tachycardia, but at this moment, it appears that the empiric therapy with the beta-blocker is starting to be productive, and the patient acknowledged this advice. 12/2/2021: Ralf Buitrago states in September 2021 he had a episode of palpitation while at [facility]. He was walking and abruptly felt palpitations, anxiety but did not seek urgent medical attention. Today he presents in SR. He reports since starting on the BB therapy he notices a improvement in palpitations but higher doses make him feel foggy. He then reports he feels \"fine\" for a few weeks then will feel \"terrible\". The patient denies any chest pain, dyspnea, dizziness, syncope, orthopnea or paroxysmal nocturnal dyspnea. He goes on to report \"feeling rain on his skin\" and \"having dream-like episodes\". He never started the Lexapro though it was prescribed for anxiety. Also complains of a chronic cough.      Patient Active Problem List    Diagnosis Date Noted    Essential hypertension 10/09/2018    Palpitations 08/15/2018    Dyslipidemia 03/30/2018    Nevus 08/02/2012    Colitis 08/02/2012       Past Medical History:   Diagnosis Date    Colitis     Dr. Charu Johnson in [address] Kidney stone 4/24/2013    Nephrolithiasis        Past Surgical History:   Procedure Laterality Date    CHOLECYSTECTOMY      INGUINAL HERNIA REPAIR      LITHOTRIPSY      TONSILLECTOMY         Family History   Problem Relation Age of Onset    Heart Disease Father 46    Hypertension Father     Parkinsonism Mother 48    Heart Disease Paternal Uncle     Kidney Cancer Paternal Uncle     Cancer Maternal Aunt         bladder cancer    Heart Disease Maternal Aunt         Hypertrophy    Heart Disease Paternal Grandfather        Social History     Tobacco Use    Smoking testing. 2. Sinus tachycardia       ECG 12/2/2021: normal EKG, normal sinus rhythm, rate: 84 bpm - see scanned caridology    I have independently reviewed all of the ECGs and rhythm strips per above. I have personally reviewed the laboratory, cardiac diagnostic and radiographic testing as outlined above. I have reviewed previous records noted in [address]. 1. Palpitations    2. Shortness of breath    3. Anxiety    4. Fatigue, unspecified type    5. Sinus tachycardia        Impression:    1. Sinus tachycardia  - Reports started in December 2017 after one steroid injection for poison oak dermatitis. - [facility] workup included:       -Holter monitor May 2, 2019: that demonstrated normal sinus rhythm with sinus arrhythmia, premature ventricular contractions, single and premature atrial contractions       -Stress test July 2, 2018:  where he exercised for 9 minutes and 38 seconds  on a Gallito protocol, achieved a peak heart rate of 176 beats per minute, which is 102% of the maximum predicted heart rate. He reached 11 METS. The resting EKG showed sinus tachycardia, at a rate of 110 beats per minute. There was no ischemic EKG changes or arrhythmia.       - Tilt table test November 15, 2019:  that demonstrated sinus tachycardia, but was essentially negative and there were no orthostatic changes       - Zio patch with multiple symptoms reported while he was in sinus rhythm or  sinus tachycardia and there were findings suggestive of an anxiety component. The average heart rate was 88 beats per minute. There were episodes of sinus tachycardia with episodes of heart rate between 144-164 beats per minute  - currently on Toprol XL for suppression  - presents in NSR  - Increase Toprol to 50mg PM and continue 37.5 mg AM  - Urged him to try anti-anxiety medications  - Await workup for VMA, pheochromocytoma, cortisol level. These were ordered by his doctors.  No benefit in additional cardiac testing at this time, he has had extensive evaluation  - Check Chest Xray for chronic cough    2. FRANDY    3. Panic attacks    4. HTN  - well controlled at this office visit    5. HLD    Recommendations:    1. He is being worked up for pheochromocytoma by the [facility] - await results. 2. Increase PM dose of Toprol XL to 50 mg. Continue the 37.5mg in the AM.  3. Continual follow up with the [facility]. 4. Obtain a CXR due to chronic cough. 5. Follow up in 6 months or sooner PRN. Encouraged the patient to call the office for any questions or concerns. I have spent a total of 60 minutes with the patient and his/her family reviewing the above stated recommendations. A total of >50% of that time involved face-to-face time providing counseling and or coordination of care with the other providers. Thank you for allowing me to participate in your patient's care. [facility] Cardiac Electrophysiology  [address] [facility]    NOTE: This report was transcribed using voice recognition software. Every effort was made to ensure accuracy; however, inadvertent computerized transcription errors may be present.

## 2021-12-04 ENCOUNTER — HOSPITAL ENCOUNTER (OUTPATIENT)
Age: 51
Discharge: HOME | End: 2021-12-04
Payer: COMMERCIAL

## 2021-12-04 LAB
ALBUMIN SERPL-MCNC: 4.7 G/DL (ref 3.5–5.2)
ALP BLD-CCNC: 92 U/L (ref 40–129)
ALT SERPL-CCNC: 35 U/L (ref 0–40)
ANION GAP SERPL CALCULATED.3IONS-SCNC: 13 MMOL/L (ref 7–16)
AST SERPL-CCNC: 23 U/L (ref 0–39)
BASOPHILS ABSOLUTE: 0.07 E9/L (ref 0–0.2)
BASOPHILS RELATIVE PERCENT: 0.8 % (ref 0–2)
BILIRUB SERPL-MCNC: 0.5 MG/DL (ref 0–1.2)
BUN BLDV-MCNC: 16 MG/DL (ref 6–20)
C-REACTIVE PROTEIN: 0.5 MG/DL (ref 0–0.4)
CALCIUM SERPL-MCNC: 9.8 MG/DL (ref 8.6–10.2)
CHLORIDE BLD-SCNC: 101 MMOL/L (ref 98–107)
CO2: 25 MMOL/L (ref 22–29)
CREAT SERPL-MCNC: 1 MG/DL (ref 0.7–1.2)
EOSINOPHILS ABSOLUTE: 0.07 E9/L (ref 0.05–0.5)
EOSINOPHILS RELATIVE PERCENT: 0.8 % (ref 0–6)
FIBRINOGEN: 593 MG/DL (ref 225–540)
GFR AFRICAN AMERICAN: >60
GFR NON-AFRICAN AMERICAN: >60 ML/MIN/1.73
GLUCOSE BLD-MCNC: 127 MG/DL (ref 74–99)
HBA1C MFR BLD: 5.9 % (ref 4–5.6)
HCT VFR BLD CALC: 48.6 % (ref 37–54)
HEMOGLOBIN: 16.1 G/DL (ref 12.5–16.5)
IMMATURE GRANULOCYTES #: 0.03 E9/L
IMMATURE GRANULOCYTES %: 0.4 % (ref 0–5)
LYMPHOCYTES ABSOLUTE: 2.01 E9/L (ref 1.5–4)
LYMPHOCYTES RELATIVE PERCENT: 23.9 % (ref 20–42)
MAGNESIUM: 2.1 MG/DL (ref 1.6–2.6)
MCH RBC QN AUTO: 29.7 PG (ref 26–35)
MCHC RBC AUTO-ENTMCNC: 33.1 % (ref 32–34.5)
MCV RBC AUTO: 89.7 FL (ref 80–99.9)
MONOCYTES ABSOLUTE: 0.75 E9/L (ref 0.1–0.95)
MONOCYTES RELATIVE PERCENT: 8.9 % (ref 2–12)
NEUTROPHILS ABSOLUTE: 5.47 E9/L (ref 1.8–7.3)
NEUTROPHILS RELATIVE PERCENT: 65.2 % (ref 43–80)
PDW BLD-RTO: 13.1 FL (ref 11.5–15)
PLATELET # BLD: 356 E9/L (ref 130–450)
PMV BLD AUTO: 10.4 FL (ref 7–12)
POTASSIUM SERPL-SCNC: 4.3 MMOL/L (ref 3.5–5)
RBC # BLD: 5.42 E12/L (ref 3.8–5.8)
SODIUM BLD-SCNC: 139 MMOL/L (ref 132–146)
T4 FREE: 1.2 NG/DL (ref 0.93–1.7)
TOTAL PROTEIN: 8.5 G/DL (ref 6.4–8.3)
TSH SERPL DL<=0.05 MIU/L-ACNC: 1.41 UIU/ML (ref 0.27–4.2)
VITAMIN D 25-HYDROXY: 57 NG/ML (ref 30–100)
WBC # BLD: 8.4 E9/L (ref 4.5–11.5)

## 2021-12-04 PROCEDURE — 82746 ASSAY OF FOLIC ACID SERUM: CPT

## 2021-12-04 PROCEDURE — 84466 ASSAY OF TRANSFERRIN: CPT

## 2021-12-04 PROCEDURE — 84207 ASSAY OF VITAMIN B-6: CPT

## 2021-12-04 PROCEDURE — 83550 IRON BINDING TEST: CPT

## 2021-12-04 PROCEDURE — 86800 THYROGLOBULIN ANTIBODY: CPT

## 2021-12-04 PROCEDURE — 86140 C-REACTIVE PROTEIN: CPT

## 2021-12-04 PROCEDURE — 83540 ASSAY OF IRON: CPT

## 2021-12-04 PROCEDURE — 82306 VITAMIN D 25 HYDROXY: CPT

## 2021-12-04 PROCEDURE — 82728 ASSAY OF FERRITIN: CPT

## 2021-12-04 PROCEDURE — 83090 ASSAY OF HOMOCYSTEINE: CPT

## 2021-12-04 PROCEDURE — 82175 ASSAY OF ARSENIC: CPT

## 2021-12-04 PROCEDURE — 86376 MICROSOMAL ANTIBODY EACH: CPT

## 2021-12-04 PROCEDURE — 83516 IMMUNOASSAY NONANTIBODY: CPT

## 2021-12-04 PROCEDURE — 85384 FIBRINOGEN ACTIVITY: CPT

## 2021-12-04 PROCEDURE — 83825 ASSAY OF MERCURY: CPT

## 2021-12-04 PROCEDURE — 83036 HEMOGLOBIN GLYCOSYLATED A1C: CPT

## 2021-12-04 PROCEDURE — 83735 ASSAY OF MAGNESIUM: CPT

## 2021-12-04 PROCEDURE — 82607 VITAMIN B-12: CPT

## 2021-12-04 PROCEDURE — 82542 COL CHROMOTOGRAPHY QUAL/QUAN: CPT

## 2021-12-04 PROCEDURE — 84443 ASSAY THYROID STIM HORMONE: CPT

## 2021-12-04 PROCEDURE — 83921 ORGANIC ACID SINGLE QUANT: CPT

## 2021-12-04 PROCEDURE — 84439 ASSAY OF FREE THYROXINE: CPT

## 2021-12-04 PROCEDURE — 86001 ALLERGEN SPECIFIC IGG: CPT

## 2021-12-04 PROCEDURE — 82525 ASSAY OF COPPER: CPT

## 2021-12-04 PROCEDURE — 80053 COMPREHEN METABOLIC PANEL: CPT

## 2021-12-04 PROCEDURE — 82172 ASSAY OF APOLIPOPROTEIN: CPT

## 2021-12-04 PROCEDURE — 85025 COMPLETE CBC W/AUTO DIFF WBC: CPT

## 2021-12-04 PROCEDURE — 84630 ASSAY OF ZINC: CPT

## 2021-12-04 PROCEDURE — 84482 T3 REVERSE: CPT

## 2021-12-04 PROCEDURE — 84425 ASSAY OF VITAMIN B-1: CPT

## 2021-12-04 PROCEDURE — 83704 LIPOPROTEIN BLD QUAN PART: CPT

## 2021-12-04 PROCEDURE — 83655 ASSAY OF LEAD: CPT

## 2021-12-04 PROCEDURE — 36415 COLL VENOUS BLD VENIPUNCTURE: CPT

## 2021-12-04 PROCEDURE — 83525 ASSAY OF INSULIN: CPT

## 2021-12-04 PROCEDURE — 84481 FREE ASSAY (FT-3): CPT

## 2021-12-05 LAB
FERRITIN: 134 NG/ML
HOMOCYSTEINE: 12.1 UMOL/L (ref 0–15)
IRON SATURATION: 31 % (ref 20–55)
IRON: 113 MCG/DL (ref 59–158)
T3 FREE: 3.7 PG/ML (ref 2–4.4)
TOTAL IRON BINDING CAPACITY: 364 MCG/DL (ref 250–450)
TRANSFERRIN: 290 MG/DL (ref 200–360)

## 2021-12-06 LAB — LIPOPROTEIN (A): 33 MG/DL

## 2021-12-07 LAB — LEAD BLOOD: <1 UG/DL (ref 0–4)

## 2021-12-08 LAB
INSULIN: 11 UIU/ML (ref 3–25)
MERCURY BLOOD: <2.5 UG/L
METHYLMALONIC ACID: 0.15 UMOL/L (ref 0–0.4)
T3 REVERSE: 20.4 NG/DL (ref 9–27)
THYROGLOBULIN ANTIBODY: 17 IU/ML (ref 0–40)
THYROID PEROXIDASE (TPO) ABS: 9.5 IU/ML (ref 0–25)
TISSUE TRANSGLUTAMINASE ANTIBODY IGG: 0.8 U/ML
TISSUE TRANSGLUTAMINASE IGA: 0.9 U/ML

## 2021-12-09 LAB
COPPER: 122 UG/DL (ref 70–140)
FOLATE: >20 NG/ML (ref 4.8–24.2)
Lab: NORMAL
Lab: NORMAL
REPORT: NORMAL
REPORT: NORMAL
THIS TEST SENT TO: NORMAL
THIS TEST SENT TO: NORMAL
VITAMIN B-12: 340 PG/ML (ref 211–946)
VITAMIN B1 WHOLE BLOOD: 157 NMOL/L (ref 70–180)
VITAMIN B6: 24.8 NMOL/L (ref 20–125)
ZINC: 109.2 UG/DL (ref 60–120)

## 2021-12-12 LAB
Lab: NORMAL
Lab: NORMAL
REPORT: NORMAL
REPORT: NORMAL
THIS TEST SENT TO: NORMAL
THIS TEST SENT TO: NORMAL

## 2021-12-16 LAB
Lab: NORMAL
REPORT: NORMAL
THIS TEST SENT TO: NORMAL

## 2022-01-07 ENCOUNTER — OFFICE VISIT (OUTPATIENT)
Dept: FAMILY MEDICINE CLINIC | Age: 52
End: 2022-01-07
Payer: COMMERCIAL

## 2022-01-07 ENCOUNTER — TELEPHONE (OUTPATIENT)
Dept: FAMILY MEDICINE CLINIC | Age: 52
End: 2022-01-07

## 2022-01-07 VITALS
HEART RATE: 105 BPM | RESPIRATION RATE: 16 BRPM | DIASTOLIC BLOOD PRESSURE: 66 MMHG | TEMPERATURE: 99.4 F | HEIGHT: 64 IN | WEIGHT: 158 LBS | SYSTOLIC BLOOD PRESSURE: 124 MMHG | BODY MASS INDEX: 26.98 KG/M2 | OXYGEN SATURATION: 95 %

## 2022-01-07 DIAGNOSIS — Z20.822 SUSPECTED COVID-19 VIRUS INFECTION: ICD-10-CM

## 2022-01-07 DIAGNOSIS — R11.0 NAUSEA: ICD-10-CM

## 2022-01-07 DIAGNOSIS — R05.9 COUGH: Primary | ICD-10-CM

## 2022-01-07 LAB
INFLUENZA A ANTIBODY: NORMAL
INFLUENZA B ANTIBODY: NORMAL
Lab: NORMAL
PERFORMING INSTRUMENT: NORMAL
QC PASS/FAIL: NORMAL
SARS-COV-2, POC: NORMAL

## 2022-01-07 PROCEDURE — 87426 SARSCOV CORONAVIRUS AG IA: CPT | Performed by: NURSE PRACTITIONER

## 2022-01-07 PROCEDURE — 87804 INFLUENZA ASSAY W/OPTIC: CPT | Performed by: NURSE PRACTITIONER

## 2022-01-07 PROCEDURE — 99213 OFFICE O/P EST LOW 20 MIN: CPT | Performed by: NURSE PRACTITIONER

## 2022-01-07 RX ORDER — PROMETHAZINE HYDROCHLORIDE 25 MG/1
25 TABLET ORAL 4 TIMES DAILY PRN
Qty: 20 TABLET | Refills: 0 | Status: SHIPPED | OUTPATIENT
Start: 2022-01-07 | End: 2022-01-14

## 2022-01-07 NOTE — PROGRESS NOTES
Subjective:  Chief Complaint   Patient presents with    Cough    Fever    Headache       HPI:  The patient states that they have had a cough, runny nose and nasal congestion for the last 1 days. Cough is productive of sputum. The patient also reports mild sore throat without pain with swallowing/difficulty swallowing. Temp max of 103. Patient denies CP or dyspnea. Has had some vomiting, taking fluids alright. Does have diarrhea as well. Patient has been taking OTC medications without improvement. The patient presents for evaluation. ROS:  Positive and pertinent negatives as per HPI. All other systems are reviewed and negative. Current Outpatient Medications:     promethazine (PHENERGAN) 25 MG tablet, Take 1 tablet by mouth 4 times daily as needed for Nausea, Disp: 20 tablet, Rfl: 0    Ascorbic Acid (VITAMIN C) 250 MG tablet, Take 250 mg by mouth daily, Disp: , Rfl:     Cholecalciferol (VITAMIN D3) 50 MCG (2000 UT) CAPS, Take by mouth 2 times daily, Disp: , Rfl:     metoprolol succinate (TOPROL XL) 25 MG extended release tablet, Take 1 tablet by mouth 2 times daily (Patient taking differently: Take 25 mg by mouth 2 times daily Pt taking 1.5 tablets BID), Disp: 60 tablet, Rfl: 3   Allergies   Allergen Reactions    Clarithromycin     Diphenhydramine     Oxycodone     Zofran [Ondansetron Hcl] Hives    Lisinopril Rash        Objective:  Vitals:    01/07/22 0949   BP: 124/66   Pulse: 119   Resp: 16   Temp: 99.4 °F (37.4 °C)   TempSrc: Temporal   SpO2: 95%   Weight: 158 lb (71.7 kg)   Height: 5' 4\" (1.626 m)        Exam:  Const: Appears healthy and well developed. No signs of acute distress present. Vitals reviewed per triage. Head/Face: Normocephalic, atraumatic. Facies is symmetric. Eyes: PERRL. ENMT: Tympanic membranes are pearly gray with good light reflex bilaterally. Nares are patent with clear rhinorrhea.  Buccal mucosa is moist. Mild erythema in the posterior pharynx without edema of oropharynx or petechiae of palate. Neck: Supple and symmetric. Palpation reveals no adenopathy. No meningeal signs. Trachea midline. Resp: Lungs are clear to auscultation bilaterally without wheezes, rhonchi, or crackles. Chest expansion was symmetrical without accessory muscle use noted. Cough present. CV: S1 is normal. S2 is normal.  Abdomen: Bowel sounds present x4 quadrants. Abdomen soft, round, nontender. Musculo: Patient moves extremities without pain or limitation. Pulses are equal bilaterally. Skin: Skin is warm and dry. Neuro: Alert and oriented x3. Speech is articulate and fluent. Psych: Mood and affect are appropriate to situation. Harris Block was seen today for cough, fever and headache. Diagnoses and all orders for this visit:    Cough  -     POCT COVID-19, Antigen  -     POCT Influenza A/B  -     COVID-19 Ambulatory; Future    Suspected COVID-19 virus infection    Nausea    Other orders  -     promethazine (PHENERGAN) 25 MG tablet; Take 1 tablet by mouth 4 times daily as needed for Nausea       The patient's rapid COVID-19 and influenza swabs were negative. I do still have a high suspicion of COVID-19, so PCR will be sent. The patient's primary symptom is nausea, so promethazine will be sent to the pharmacy for him. I have reviewed monitoring parameters including temperature and pulse ox. He is to maintain hydration. Isolation parameters were reviewed. Reviewed over-the-counter medications that would be beneficial for symptoms.     Seen By:    JIM Birmingham - CNP

## 2022-01-07 NOTE — TELEPHONE ENCOUNTER
Patient;s wife called (he was in the background) and said since he has a heart condition, they think he should be taking medication for covid. He said he was told to wait 2 days until the swab came back but thinks he needs medication so he does not get worse.

## 2022-01-07 NOTE — TELEPHONE ENCOUNTER
Unfortunately, there is not a medication to give to treat COVID-19 at this time. This of course is always changing. If his PCR does come back positive, I can order monoclonal antibody infusion. I would not recommend steroid therapy for him, as this may be likely to increase his heart rate, and palpitations, which is the reason that he is currently taking the metoprolol. The best treatment for this is to take medication to help with symptoms.

## 2022-01-07 NOTE — TELEPHONE ENCOUNTER
Notified patient's wife. 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  She will let us know

## 2022-01-10 DIAGNOSIS — R05.9 COUGH: ICD-10-CM

## 2022-01-11 ENCOUNTER — TELEPHONE (OUTPATIENT)
Dept: FAMILY MEDICINE CLINIC | Age: 52
End: 2022-01-11

## 2022-01-11 ENCOUNTER — OFFICE VISIT (OUTPATIENT)
Dept: FAMILY MEDICINE CLINIC | Age: 52
End: 2022-01-11
Payer: COMMERCIAL

## 2022-01-11 VITALS
WEIGHT: 158 LBS | HEART RATE: 98 BPM | SYSTOLIC BLOOD PRESSURE: 138 MMHG | RESPIRATION RATE: 18 BRPM | HEIGHT: 64 IN | DIASTOLIC BLOOD PRESSURE: 82 MMHG | OXYGEN SATURATION: 95 % | BODY MASS INDEX: 26.98 KG/M2 | TEMPERATURE: 97 F

## 2022-01-11 DIAGNOSIS — U07.1 COVID-19: ICD-10-CM

## 2022-01-11 DIAGNOSIS — B37.0 THRUSH, ORAL: ICD-10-CM

## 2022-01-11 DIAGNOSIS — R05.9 COUGH: Primary | ICD-10-CM

## 2022-01-11 DIAGNOSIS — H66.92 LEFT OTITIS MEDIA, UNSPECIFIED OTITIS MEDIA TYPE: ICD-10-CM

## 2022-01-11 LAB
Lab: ABNORMAL
QC PASS/FAIL: ABNORMAL
SARS-COV-2 RDRP RESP QL NAA+PROBE: POSITIVE

## 2022-01-11 PROCEDURE — 99213 OFFICE O/P EST LOW 20 MIN: CPT | Performed by: NURSE PRACTITIONER

## 2022-01-11 PROCEDURE — 87635 SARS-COV-2 COVID-19 AMP PRB: CPT | Performed by: NURSE PRACTITIONER

## 2022-01-11 RX ORDER — AZITHROMYCIN 250 MG/1
250 TABLET, FILM COATED ORAL SEE ADMIN INSTRUCTIONS
Qty: 6 TABLET | Refills: 0 | Status: SHIPPED | OUTPATIENT
Start: 2022-01-11 | End: 2022-01-16

## 2022-01-11 RX ORDER — BENZONATATE 100 MG/1
100 CAPSULE ORAL 3 TIMES DAILY PRN
Qty: 30 CAPSULE | Refills: 0 | Status: SHIPPED | OUTPATIENT
Start: 2022-01-11 | End: 2022-01-18

## 2022-01-11 NOTE — TELEPHONE ENCOUNTER
Patient was seen at [facility] urgent care for coughing, upset stomach, rapid was negative but waiting on send out. .. they are treating him as if he is positive, however he now has bloody mucous and his heart rate is high. .. any advise you can give will be helpful.

## 2022-01-11 NOTE — TELEPHONE ENCOUNTER
Patient going to the walk in right now but appt made for tomorrow on Red Visit as patient requested.   He will call in morning if not coming

## 2022-01-12 LAB
SARS-COV-2: NOT DETECTED
SOURCE: NORMAL

## 2022-01-18 ENCOUNTER — TELEPHONE (OUTPATIENT)
Dept: FAMILY MEDICINE CLINIC | Age: 52
End: 2022-01-18

## 2022-01-19 ENCOUNTER — OFFICE VISIT (OUTPATIENT)
Dept: FAMILY MEDICINE CLINIC | Age: 52
End: 2022-01-19
Payer: COMMERCIAL

## 2022-01-19 VITALS
OXYGEN SATURATION: 95 % | TEMPERATURE: 97.4 F | DIASTOLIC BLOOD PRESSURE: 98 MMHG | SYSTOLIC BLOOD PRESSURE: 149 MMHG | HEART RATE: 95 BPM | WEIGHT: 155 LBS | HEIGHT: 64 IN | BODY MASS INDEX: 26.46 KG/M2

## 2022-01-19 DIAGNOSIS — J98.01 COUGH DUE TO BRONCHOSPASM: Primary | ICD-10-CM

## 2022-01-19 DIAGNOSIS — U07.1 COVID-19: ICD-10-CM

## 2022-01-19 DIAGNOSIS — B37.0 ORAL THRUSH: ICD-10-CM

## 2022-01-19 PROCEDURE — 99213 OFFICE O/P EST LOW 20 MIN: CPT | Performed by: FAMILY MEDICINE

## 2022-01-19 RX ORDER — ALBUTEROL SULFATE 90 UG/1
2 AEROSOL, METERED RESPIRATORY (INHALATION) EVERY 4 HOURS PRN
Qty: 18 G | Refills: 3 | Status: SHIPPED
Start: 2022-01-19 | End: 2022-01-19

## 2022-01-19 RX ORDER — FLUCONAZOLE 150 MG/1
150 TABLET ORAL ONCE
Qty: 1 TABLET | Refills: 0 | Status: SHIPPED | OUTPATIENT
Start: 2022-01-19 | End: 2022-01-19

## 2022-01-19 RX ORDER — GUAIFENESIN/DEXTROMETHORPHAN 100-10MG/5
5 SYRUP ORAL 3 TIMES DAILY PRN
Qty: 120 ML | Refills: 0 | Status: SHIPPED | OUTPATIENT
Start: 2022-01-19 | End: 2022-01-29

## 2022-01-19 RX ORDER — PREDNISONE 20 MG/1
40 TABLET ORAL DAILY
Qty: 10 TABLET | Refills: 0 | Status: SHIPPED | OUTPATIENT
Start: 2022-01-19 | End: 2022-01-24

## 2022-01-19 RX ORDER — BUDESONIDE AND FORMOTEROL FUMARATE DIHYDRATE 160; 4.5 UG/1; UG/1
2 AEROSOL RESPIRATORY (INHALATION) 2 TIMES DAILY
Qty: 10.2 G | Refills: 0 | Status: SHIPPED
Start: 2022-01-19 | End: 2022-02-11

## 2022-01-19 NOTE — PROGRESS NOTES
1/19/22    Corey Brooke is a 45 y.o. male here for:    HPI:    COVID positive  Positive on 01/13/22 but had symptoms starting on 01/07/22  Mucinex helps thins secretions   Still has cough   On Nystatin for oral thrush, minimal improvement   Ear pain present  Finished Zithromax  Increased fatigue  No vaccination     BP Readings from Last 3 Encounters:   01/19/22 (!) 149/98   01/11/22 138/82   01/07/22 124/66     Current Outpatient Medications   Medication Sig Dispense Refill    guaiFENesin-dextromethorphan (ROBITUSSIN DM) 100-10 MG/5ML syrup Take 5 mLs by mouth 3 times daily as needed for Cough 120 mL 0    predniSONE (DELTASONE) 20 MG tablet Take 2 tablets by mouth daily for 5 days 10 tablet 0    budesonide-formoterol (SYMBICORT) 160-4.5 MCG/ACT AERO Inhale 2 puffs into the lungs 2 times daily 10.2 g 0    nystatin (MYCOSTATIN) 666864 UNIT/ML suspension Take 5 mLs by mouth 4 times daily for 10 days Retain in mouth as long as possible 200 mL 0    Ascorbic Acid (VITAMIN C) 250 MG tablet Take 250 mg by mouth daily      Cholecalciferol (VITAMIN D3) 50 MCG (2000 UT) CAPS Take by mouth 2 times daily      metoprolol succinate (TOPROL XL) 25 MG extended release tablet Take 1 tablet by mouth 2 times daily (Patient taking differently: Take 25 mg by mouth 2 times daily Pt taking 1.5 tablets BID) 60 tablet 3     No current facility-administered medications for this visit.       Allergies   Allergen Reactions    Clarithromycin     Diphenhydramine     Oxycodone     Zofran [Ondansetron Hcl] Hives    Lisinopril Rash       Past Medical & Surgical History:      Diagnosis Date    Colitis     [facility]. Milagros Nicholas in [facility] Kidney stone 4/24/2013    Nephrolithiasis      Past Surgical History:   Procedure Laterality Date    CHOLECYSTECTOMY      INGUINAL HERNIA REPAIR      LITHOTRIPSY      TONSILLECTOMY         Family History:      Problem Relation Age of Onset    Heart Disease Father 46    Hypertension Father     Parkinsonism Mother 48    Heart Disease Paternal Uncle     Kidney Cancer Paternal Uncle     Cancer Maternal Aunt         bladder cancer    Heart Disease Maternal Aunt         Hypertrophy    Heart Disease Paternal Grandfather        Social History:  Social History     Tobacco Use    Smoking status: Never Smoker    Smokeless tobacco: Never Used   Substance Use Topics    Alcohol use: No         There is no immunization history on file for this patient. Review of Systems   Constitutional: Negative for appetite change and fatigue. HENT: Negative for congestion and sneezing. Respiratory: Negative for cough and shortness of breath. Cardiovascular: Negative for chest pain. Gastrointestinal: Negative for abdominal pain, constipation, diarrhea, nausea and vomiting. Genitourinary: Negative for dysuria. Neurological: Negative for dizziness, numbness and headaches. Psychiatric/Behavioral: The patient is not nervous/anxious. VS:  BP (!) 149/98   Pulse 95   Temp 97.4 °F (36.3 °C) (Temporal)   Ht 5' 4\" (1.626 m)   Wt 155 lb (70.3 kg)   SpO2 95%   BMI 26.61 kg/m²     Physical Exam  Vitals reviewed. Constitutional:       Appearance: Normal appearance. He is not ill-appearing. HENT:      Head: Normocephalic. Right Ear: Tympanic membrane, ear canal and external ear normal.      Left Ear: Tympanic membrane, ear canal and external ear normal.      Ears:      Comments: Fluid present behind TM     Nose: Congestion present. Mouth/Throat:      Mouth: Mucous membranes are moist.      Comments: White plagues on tongue and left cheek  Eyes:      Pupils: Pupils are equal, round, and reactive to light. Cardiovascular:      Rate and Rhythm: Normal rate and regular rhythm. Heart sounds: Normal heart sounds. No murmur heard. Pulmonary:      Effort: Pulmonary effort is normal.      Breath sounds: Normal breath sounds. No wheezing or rhonchi.       Comments: Cough with deep breathing    Musculoskeletal:      Right lower leg: No edema. Left lower leg: No edema. Skin:     General: Skin is warm. Capillary Refill: Capillary refill takes less than 2 seconds. Coloration: Skin is not pale. Neurological:      Mental Status: He is alert and oriented to person, place, and time. Psychiatric:         Mood and Affect: Mood normal.         Behavior: Behavior normal.         Thought Content: Thought content normal.         Judgment: Judgment normal.         Assessment/Plan:  1. Cough due to bronchospasm  Due to COVID 19. Will give Symbicort and Prednisone. Patient is concerned that the Prednisone will give him palpitations. I explained that steroids is the only medication showing some effectiveness with COVID 19 infection. - predniSONE (DELTASONE) 20 MG tablet; Take 2 tablets by mouth daily for 5 days  Dispense: 10 tablet; Refill: 0  - budesonide-formoterol (SYMBICORT) 160-4.5 MCG/ACT AERO; Inhale 2 puffs into the lungs 2 times daily  Dispense: 10.2 g; Refill: 0    2. Oral thrush  Will give one dose of oral Diflucan. Continue Nystatin  - fluconazole (DIFLUCAN) 150 MG tablet; Take 1 tablet by mouth once for 1 dose  Dispense: 1 tablet; Refill: 0    3. COVID-19  Robitussin DM for cough   - guaiFENesin-dextromethorphan (ROBITUSSIN DM) 100-10 MG/5ML syrup; Take 5 mLs by mouth 3 times daily as needed for Cough  Dispense: 120 mL; Refill: 0      Return to office: Return in 2 weeks (on 2/2/2022), or if symptoms worsen or fail to improve. Counseled regarding above diagnosis, including possible risks and complications, especially if left uncontrolled. I encourage you to do some reading and education about your health. The [employer] provides information on many health conditions:http://familydoctor. org     Counseled regarding the possible side effects, risks, benefits and alternatives to treatment; patient and/or guardian verbalizes understanding, agrees, feels comfortable with and wishes to proceed with above treatment plan. Advised patient to call with any new medication issues, and read all Rx info from pharmacy to assure aware of all possible risks and side effects of medication before taking. Patient and/or guardian verbalizes understanding and agrees with above counseling, assessment and plan.      Henry Curran MD  Family Medicine   PGY-3

## 2022-01-19 NOTE — PROGRESS NOTES
COVID infection since 1/7  Cough with deep inspiration  Fatigue  No dyspnea  Examination  Blood pressure (!) 149/98, pulse 95, temperature 97.4 °F (36.3 °C), temperature source Temporal, height 5' 4\" (1.626 m), weight 155 lb (70.3 kg), SpO2 95 %. Oropharyngeal thrush  lungs clear but cough with deep inspiration  No distress  A/P  Diflucan for thrush  flonase  Course of prednisone 40 mg for 5 days  Attending Physician Statement  I have discussed the case, including pertinent history and exam findings with the resident. I agree with the documented assessment and plan.

## 2022-01-20 ASSESSMENT — ENCOUNTER SYMPTOMS
NAUSEA: 0
ABDOMINAL PAIN: 0
DIARRHEA: 0
COUGH: 0
VOMITING: 0
CONSTIPATION: 0
SHORTNESS OF BREATH: 0

## 2022-02-04 ENCOUNTER — OFFICE VISIT (OUTPATIENT)
Dept: FAMILY MEDICINE CLINIC | Age: 52
End: 2022-02-04
Payer: COMMERCIAL

## 2022-02-04 VITALS
HEIGHT: 64 IN | BODY MASS INDEX: 26.46 KG/M2 | HEART RATE: 104 BPM | TEMPERATURE: 97.5 F | RESPIRATION RATE: 18 BRPM | OXYGEN SATURATION: 97 % | WEIGHT: 155 LBS | SYSTOLIC BLOOD PRESSURE: 122 MMHG | DIASTOLIC BLOOD PRESSURE: 74 MMHG

## 2022-02-04 DIAGNOSIS — J02.9 SORE THROAT: ICD-10-CM

## 2022-02-04 DIAGNOSIS — B37.0 ORAL THRUSH: ICD-10-CM

## 2022-02-04 DIAGNOSIS — J01.10 ACUTE NON-RECURRENT FRONTAL SINUSITIS: Primary | ICD-10-CM

## 2022-02-04 LAB — S PYO AG THROAT QL: NORMAL

## 2022-02-04 PROCEDURE — 87880 STREP A ASSAY W/OPTIC: CPT | Performed by: NURSE PRACTITIONER

## 2022-02-04 PROCEDURE — 99213 OFFICE O/P EST LOW 20 MIN: CPT | Performed by: NURSE PRACTITIONER

## 2022-02-04 RX ORDER — AMOXICILLIN AND CLAVULANATE POTASSIUM 875; 125 MG/1; MG/1
1 TABLET, FILM COATED ORAL 2 TIMES DAILY
Qty: 20 TABLET | Refills: 0 | Status: SHIPPED | OUTPATIENT
Start: 2022-02-04 | End: 2022-02-14

## 2022-02-04 RX ORDER — CLOTRIMAZOLE 10 MG/1
10 LOZENGE ORAL; TOPICAL
Qty: 50 TABLET | Refills: 0 | Status: SHIPPED
Start: 2022-02-04 | End: 2022-02-11

## 2022-02-04 NOTE — PROGRESS NOTES
22  Nicole Hoff : 1970 Sex: male  Age 45 y.o. Subjective:  Chief Complaint   Patient presents with    Pharyngitis     patient states that he has had a sore throat for a few days. . patient did have thrush a few weeks ago. . he also had covid recently        HPI:   Nicole Hoff , 45 y.o. male presents to the clinic for evaluation of sinus congestion x 3 weeks and sore throat x 2 days. The patient also reports oral thrush which has improved but has not totally resolved. The patient has taken cough drops for sore throat and Nystatin and Diflucan (x1 dose) for thrush. The patient reports unchanged sinus congestion. The patient denies ill exposure. The patient reports hx of COVID-19 (22) and denies having the vaccines. The patient denies acute loss of taste and smell, headache, cough, rash, and fever. The patient also denies chest pain, abdominal pain, shortness of breath, and nausea / vomiting / diarrhea. ROS:   Unless otherwise stated in this report the patient's positive and negative responses for review of systems for constitutional, eyes, ENT, cardiovascular, respiratory, gastrointestinal, neurological, , musculoskeletal, and integument systems and related systems to the presenting problem are either stated in the history of present illness or were not pertinent or were negative for the symptoms and/or complaints related to the presenting medical problem. Positives and pertinent negatives as per HPI. All others reviewed and are negative.       PMH:     Past Medical History:   Diagnosis Date    Colitis     Dr. Huerta in [facility] Kidney stone 2013    Nephrolithiasis        Past Surgical History:   Procedure Laterality Date    CHOLECYSTECTOMY      INGUINAL HERNIA REPAIR      LITHOTRIPSY      TONSILLECTOMY         Family History   Problem Relation Age of Onset    Heart Disease Father 46    Hypertension Father     Parkinsonism Mother 48    Heart Disease Paternal Uncle     Kidney Cancer Paternal Uncle     Cancer Maternal Aunt         bladder cancer    Heart Disease Maternal Aunt         Hypertrophy    Heart Disease Paternal Grandfather        Medications:     Current Outpatient Medications:     amoxicillin-clavulanate (AUGMENTIN) 875-125 MG per tablet, Take 1 tablet by mouth 2 times daily for 10 days, Disp: 20 tablet, Rfl: 0    clotrimazole (MYCELEX) 10 MG zackary, Take 1 tablet by mouth 5 times daily for 10 days, Disp: 50 tablet, Rfl: 0    budesonide-formoterol (SYMBICORT) 160-4.5 MCG/ACT AERO, Inhale 2 puffs into the lungs 2 times daily, Disp: 10.2 g, Rfl: 0    Ascorbic Acid (VITAMIN C) 250 MG tablet, Take 250 mg by mouth daily, Disp: , Rfl:     Cholecalciferol (VITAMIN D3) 50 MCG (2000 UT) CAPS, Take by mouth 2 times daily, Disp: , Rfl:     metoprolol succinate (TOPROL XL) 25 MG extended release tablet, Take 1 tablet by mouth 2 times daily (Patient taking differently: Take 25 mg by mouth 2 times daily Pt taking 1.5 tablets BID), Disp: 60 tablet, Rfl: 3    Allergies: Allergies   Allergen Reactions    Clarithromycin     Diphenhydramine     Oxycodone     Zofran [Ondansetron Hcl] Hives    Lisinopril Rash       Social History:     Social History     Tobacco Use    Smoking status: Never Smoker    Smokeless tobacco: Never Used   Vaping Use    Vaping Use: Never used   Substance Use Topics    Alcohol use: No    Drug use: No       Patient lives at home. Physical Exam:     Vitals:    02/04/22 1604   BP: 122/74   Pulse: 104   Resp: 18   Temp: 97.5 °F (36.4 °C)   SpO2: 97%   Weight: 155 lb (70.3 kg)   Height: 5' 4\" (1.626 m)       Physical Exam (PE)    Physical Exam  Constitutional:       Appearance: Normal appearance. HENT:      Head: Normocephalic. Right Ear: Ear canal and external ear normal. A middle ear effusion is present. Left Ear: Ear canal and external ear normal. A middle ear effusion is present. Nose: Congestion and rhinorrhea present. Right Turbinates: Swollen. Left Turbinates: Swollen. Right Sinus: Frontal sinus tenderness present. Left Sinus: Frontal sinus tenderness present. Mouth/Throat:      Mouth: Mucous membranes are moist.      Pharynx: Oropharynx is clear. Comments: White Plaque to tongue. Eyes:      Pupils: Pupils are equal, round, and reactive to light. Cardiovascular:      Rate and Rhythm: Normal rate and regular rhythm. Pulses: Normal pulses. Heart sounds: Normal heart sounds. Pulmonary:      Effort: Pulmonary effort is normal.      Breath sounds: Normal breath sounds. No wheezing, rhonchi or rales. Abdominal:      General: Bowel sounds are normal.      Palpations: Abdomen is soft. Musculoskeletal:         General: Normal range of motion. Cervical back: Normal range of motion and neck supple. Lymphadenopathy:      Cervical: No cervical adenopathy. Skin:     General: Skin is warm and dry. Capillary Refill: Capillary refill takes less than 2 seconds. Neurological:      General: No focal deficit present. Mental Status: He is alert and oriented to person, place, and time. Psychiatric:         Mood and Affect: Mood normal.         Behavior: Behavior normal.          Testing:   (All laboratory and radiology results have been personally reviewed by myself)  Labs:  Results for orders placed or performed in visit on 02/04/22   POCT rapid strep A   Result Value Ref Range    Strep A Ag None Detected None Detected       Imaging: All Radiology results interpreted by Radiologist unless otherwise noted. No orders to display       Assessment / Plan:   The patient's vitals, allergies, medications, and past medical history have been reviewed. Juancarlos Delatorre was seen today for pharyngitis. Diagnoses and all orders for this visit:    Acute non-recurrent frontal sinusitis  -     amoxicillin-clavulanate (AUGMENTIN) 875-125 MG per tablet;  Take 1 tablet by mouth 2 times daily for 10 days    Oral thrush  -     clotrimazole (MYCELEX) 10 MG zackary; Take 1 tablet by mouth 5 times daily for 10 days    Sore throat  -     POCT rapid strep A        - Disposition: Home    - Educational material printed for patient's review and were included in patient instructions. After Visit Summary and given to patient at the end of visit. - Encouraged oral fluids and rest. Discussed symptomatic treatments with patient today including Zyrtec / Flonase prn rhinitis and Tylenol prn for fever / pain. Schedule a follow-up with PCP in 2-3 days. Red flag symptoms were discussed with the patient today. The patient is directed to go the ED if symptoms change or worsen. Pt verbalizes understanding and is in agreement with plan of care. All questions answered. SIGNATURE: NAI Yip    *NOTE: This report was transcribed using voice recognition software. Every effort was made to ensure accuracy; however, inadvertent computerized transcription errors may be present.

## 2022-02-04 NOTE — PATIENT INSTRUCTIONS
Patient Education        Sinusitis: Care Instructions  Your Care Instructions     Sinusitis is an infection of the lining of the sinus cavities in your head. Sinusitis often follows a cold. It causes pain and pressure in your head and face. In most cases, sinusitis gets better on its own in 1 to 2 weeks. But some mild symptoms may last for several weeks. Sometimes antibiotics are needed. Follow-up care is a key part of your treatment and safety. Be sure to make and go to all appointments, and call your doctor if you are having problems. It's also a good idea to know your test results and keep a list of the medicines you take. How can you care for yourself at home? · Take an over-the-counter pain medicine, such as acetaminophen (Tylenol), ibuprofen (Advil, Motrin), or naproxen (Aleve). Read and follow all instructions on the label. · If the doctor prescribed antibiotics, take them as directed. Do not stop taking them just because you feel better. You need to take the full course of antibiotics. · Be careful when taking over-the-counter cold or flu medicines and Tylenol at the same time. Many of these medicines have acetaminophen, which is Tylenol. Read the labels to make sure that you are not taking more than the recommended dose. Too much acetaminophen (Tylenol) can be harmful. · Breathe warm, moist air from a steamy shower, a hot bath, or a sink filled with hot water. Avoid cold, dry air. Using a humidifier in your home may help. Follow the directions for cleaning the machine. · Use saline (saltwater) nasal washes. This can help keep your nasal passages open and wash out mucus and bacteria. You can buy saline nose drops at a grocery store or drugstore. Or you can make your own at home by adding 1 teaspoon of salt and 1 teaspoon of baking soda to 2 cups of distilled water. If you make your own, fill a bulb syringe with the solution, insert the tip into your nostril, and squeeze gently.  Yimi Patter your nose.  · Put a hot, wet towel or a warm gel pack on your face 3 or 4 times a day for 5 to 10 minutes each time. · Try a decongestant nasal spray like oxymetazoline (Afrin). Do not use it for more than 3 days in a row. Using it for more than 3 days can make your congestion worse. When should you call for help? Call your doctor now or seek immediate medical care if:    · You have new or worse swelling or redness in your face or around your eyes.     · You have a new or higher fever. Watch closely for changes in your health, and be sure to contact your doctor if:    · You have new or worse facial pain.     · The mucus from your nose becomes thicker (like pus) or has new blood in it.     · You are not getting better as expected. Where can you learn more? Go to https://[employer]. org and sign in to your [employer] account. Enter E214 in the [employer] box to learn more about \"Sinusitis: Care Instructions. \"     If you do not have an account, please click on the \"Sign Up Now\" link. Current as of: September 8, 2021               Content Version: 13.1  © 9555-9494 Healthwise, Incorporated. Care instructions adapted under license by [facility] ([facility]). If you have questions about a medical condition or this instruction, always ask your healthcare professional. [place] [address] any warranty or liability for your use of this information.

## 2022-02-07 ENCOUNTER — OFFICE VISIT (OUTPATIENT)
Dept: FAMILY MEDICINE CLINIC | Age: 52
End: 2022-02-07
Payer: COMMERCIAL

## 2022-02-07 VITALS
OXYGEN SATURATION: 100 % | BODY MASS INDEX: 26.29 KG/M2 | HEART RATE: 99 BPM | DIASTOLIC BLOOD PRESSURE: 87 MMHG | TEMPERATURE: 97.4 F | WEIGHT: 154 LBS | SYSTOLIC BLOOD PRESSURE: 125 MMHG | HEIGHT: 64 IN

## 2022-02-07 DIAGNOSIS — B37.0 ORAL CANDIDIASIS: Primary | ICD-10-CM

## 2022-02-07 PROCEDURE — 82962 GLUCOSE BLOOD TEST: CPT

## 2022-02-07 PROCEDURE — 99213 OFFICE O/P EST LOW 20 MIN: CPT

## 2022-02-07 RX ORDER — FLUCONAZOLE 100 MG/1
100 TABLET ORAL DAILY
Qty: 15 TABLET | Refills: 0 | Status: SHIPPED | OUTPATIENT
Start: 2022-02-07 | End: 2022-02-22

## 2022-02-07 ASSESSMENT — ENCOUNTER SYMPTOMS
EYE PAIN: 0
CONSTIPATION: 0
SHORTNESS OF BREATH: 0
SORE THROAT: 0
ABDOMINAL DISTENTION: 0
DIARRHEA: 1
FACIAL SWELLING: 0
TROUBLE SWALLOWING: 0
SINUS PAIN: 0
BACK PAIN: 0
WHEEZING: 0
COUGH: 0
RHINORRHEA: 0
NAUSEA: 0
VOMITING: 0
ABDOMINAL PAIN: 0
VOICE CHANGE: 0
SINUS PRESSURE: 0

## 2022-02-07 NOTE — LETTER
[address] [address]  Phone: [phone]  Fax: [phone]    Tom Bledsoe MD        February 7, 2022     Patient: Rebeca Hatch   YOB: 1970   Date of Visit: 2/7/2022       To Whom It May Concern: It is my medical opinion that Sunday Ramirez is able to return to work on 2/8/22  If you have any questions or concerns, please don't hesitate to call.     Sincerely,        Tom Bledsoe MD

## 2022-02-07 NOTE — PROGRESS NOTES
S: 45 y.o. male with   Chief Complaint   Patient presents with   Oj Knee     Pt is here because of continued thrush since his COVID infection. O: VS:  height is 5' 4\" (1.626 m) and weight is 154 lb (69.9 kg). His temperature is 97.4 °F (36.3 °C). His blood pressure is 125/87 and his pulse is 99. His oxygen saturation is 100%. BP Readings from Last 3 Encounters:   02/07/22 125/87   02/04/22 122/74   01/19/22 (!) 149/98     See resident note      Impression/Plan:   1) recurrent oral pharyngeal thrush - treat with diflucan. Check sugar today. Stop symbicort. Health Maintenance Due   Topic Date Due    Hepatitis C screen  Never done    COVID-19 Vaccine (1) Never done    DTaP/Tdap/Td vaccine (1 - Tdap) Never done    Colon cancer screen colonoscopy  Never done    Shingles Vaccine (1 of 2) Never done    Flu vaccine (1) Never done         Attending Physician Statement  I have discussed the case, including pertinent history and exam findings with the resident. I also have seen the patient and performed key portions of the examination. I agree with the documented assessment and plan.       La Nena Rain MD

## 2022-02-07 NOTE — PROGRESS NOTES
[facility]  Department of Family Medicine  Family Medicine Residency Program      Patient: Kelly Moyer 45 y.o. male     Date of Service: 2/7/22      Chief complaint:   Chief Complaint   Patient presents with   Sahra Connolly       HISTORY OF PRESENTING ILLNESS     45 y.o. male presented to the clinic complaining of oral thrush. Patient states he has had oral thrush since January 01/07/2022 when he tested positive for Covid. He was given nystatin at urgent care at this time. Patient was seen in clinic here 01/19/2022. States that he was given Diflucan for 1 dose which improved his symptoms for 3 days. During this visit he was given a course of prednisone burst, Flonase and Symbicort for persistent cough. States he did not take prednisone but was taking Flonase and Symbicort. Patient states that he has been gargling his mouth after Symbicort use. Patient stopped taking Symbicort on 02/02/2022. Patient was seen by urgent care on 02/04/2022 for sinus congestion. He was prescribed Augmentin twice daily for 10 days and clotrimazole 10 mg 5 times a day for 10 days for a sinus infection. Today patient is complaining of oral thrush. He is also complaining of a mild sore throat. Denies difficulty swallowing liquids or solids, fevers, chills, decreased appetite, weight loss. Patient states that his sinus pain and pressure have improved. Patient complaining of diarrhea since starting antibiotics. Denies abdominal pain or distention  Patient complaining of occasional dry cough. Reports post nasal drip. Patient states that he is not fully compliant with his Flonase and Zyrtec at bedtime.     Patient wife was present and states that he determines what medications he wants to take    Health Maintenance:  Health Maintenance Due   Topic Date Due    Hepatitis C screen  Never done    COVID-19 Vaccine (1) Never done    DTaP/Tdap/Td vaccine (1 - Tdap) Never done    Colon cancer screen colonoscopy  Never done    Shingles Vaccine (1 of 2) Never done    Flu vaccine (1) Never done     Past Medical History:      Diagnosis Date    Colitis     Dr. Estrellita Cronin in [address] Kidney stone 4/24/2013    Nephrolithiasis      Past Surgical History:        Procedure Laterality Date    CHOLECYSTECTOMY      INGUINAL HERNIA REPAIR      LITHOTRIPSY      TONSILLECTOMY       Allergies:    Clarithromycin, Diphenhydramine, Oxycodone, Zofran [ondansetron hcl], and Lisinopril  Social History:   Social History     Socioeconomic History    Marital status:      Spouse name: Not on file    Number of children: Not on file    Years of education: Not on file    Highest education level: Not on file   Occupational History    Not on file   Tobacco Use    Smoking status: Never Smoker    Smokeless tobacco: Never Used   Vaping Use    Vaping Use: Never used   Substance and Sexual Activity    Alcohol use: No    Drug use: No    Sexual activity: Not on file   Other Topics Concern    Not on file   Social History Narrative    Not on file     Social Determinants of Health     Financial Resource Strain: Low Risk     Difficulty of Paying Living Expenses: Not hard at all   Food Insecurity: No Food Insecurity    Worried About Running Out of Food in the Last Year: Never true    [address] in the Last Year: Never true   Transportation Needs:     Lack of Transportation (Medical): Not on file    Lack of Transportation (Non-Medical):  Not on file   Physical Activity:     Days of Exercise per Week: Not on file    Minutes of Exercise per Session: Not on file   Stress:     Feeling of Stress : Not on file   Social Connections:     Frequency of Communication with Friends and Family: Not on file    Frequency of Social Gatherings with Friends and Family: Not on file    Attends Taoism Services: Not on file    Active Member of Clubs or Organizations: Not on file    Attends Club or Organization Meetings: Not on file    Marital Status: Not on file   Intimate Partner Violence:     Fear of Current or Ex-Partner: Not on file    Emotionally Abused: Not on file    Physically Abused: Not on file    Sexually Abused: Not on file   Housing Stability:     Unable to Pay for Housing in the Last Year: Not on file    Number of [place] in the Last Year: Not on file    Unstable Housing in the Last Year: Not on file      Family History:       Problem Relation Age of Onset    Heart Disease Father 46    Hypertension Father     Parkinsonism Mother 48    Heart Disease Paternal Uncle     Kidney Cancer Paternal Uncle     Cancer Maternal Aunt         bladder cancer    Heart Disease Maternal Aunt         Hypertrophy    Heart Disease Paternal Grandfather      Review of Systems:   Review of Systems   Constitutional: Negative for chills, fatigue, fever and unexpected weight change. HENT: Positive for mouth sores (White lesions on tongue) and postnasal drip. Negative for congestion, dental problem, drooling, ear discharge, ear pain, facial swelling, hearing loss, nosebleeds, rhinorrhea, sinus pressure, sinus pain, sneezing, sore throat, tinnitus, trouble swallowing and voice change. Eyes: Negative for pain and visual disturbance. Respiratory: Negative for cough, shortness of breath and wheezing. Cardiovascular: Negative for chest pain, palpitations and leg swelling. Gastrointestinal: Positive for diarrhea. Negative for abdominal distention, abdominal pain, constipation, nausea and vomiting. Genitourinary: Negative for difficulty urinating, dysuria, genital sores and hematuria. Musculoskeletal: Negative for arthralgias, back pain, gait problem and myalgias. Skin: Negative for rash and wound. Neurological: Negative for dizziness, weakness, light-headedness, numbness and headaches. Psychiatric/Behavioral: Negative for dysphoric mood. The patient is not nervous/anxious.         PHYSICAL EXAM   Vitals: /87   Pulse 99   Temp 97.4 °F (36.3 °C)   Ht 5' 4\" (1.626 m)   Wt 154 lb (69.9 kg)   SpO2 100%   BMI 26.43 kg/m²   Physical Exam  Constitutional:       General: He is not in acute distress. Appearance: Normal appearance. He is not ill-appearing, toxic-appearing or diaphoretic. HENT:      Head: Normocephalic and atraumatic. Right Ear: Tympanic membrane, ear canal and external ear normal. There is no impacted cerumen. Left Ear: Tympanic membrane, ear canal and external ear normal. There is no impacted cerumen. Nose: Nose normal. No congestion or rhinorrhea. Mouth/Throat:      Mouth: Mucous membranes are moist.      Pharynx: No posterior oropharyngeal erythema. Comments: White plaques on tongue   Eyes:      General:         Right eye: No discharge. Left eye: No discharge. Extraocular Movements: Extraocular movements intact. Conjunctiva/sclera: Conjunctivae normal.      Pupils: Pupils are equal, round, and reactive to light. Cardiovascular:      Rate and Rhythm: Normal rate and regular rhythm. Pulses: Normal pulses. Heart sounds: Normal heart sounds. No murmur heard. Pulmonary:      Effort: Pulmonary effort is normal. No respiratory distress. Breath sounds: Normal breath sounds. No stridor. No wheezing or rhonchi. Chest:   Breasts:      Right: No supraclavicular adenopathy. Left: No supraclavicular adenopathy. Abdominal:      General: Abdomen is flat. Bowel sounds are normal. There is no distension. Palpations: Abdomen is soft. There is no mass. Tenderness: There is no abdominal tenderness. There is no guarding. Musculoskeletal:         General: No swelling. Normal range of motion. Cervical back: Normal range of motion and neck supple. Right lower leg: No edema. Left lower leg: No edema. Lymphadenopathy:      Cervical: No cervical adenopathy. Right cervical: No deep or posterior cervical adenopathy.      Left cervical: No deep or posterior cervical adenopathy. Upper Body:      Right upper body: No supraclavicular adenopathy. Left upper body: No supraclavicular adenopathy. Skin:     Capillary Refill: Capillary refill takes less than 2 seconds. Coloration: Skin is not jaundiced or pale. Neurological:      General: No focal deficit present. Mental Status: He is alert and oriented to person, place, and time. Psychiatric:         Mood and Affect: Mood normal.         Behavior: Behavior normal.         Thought Content: Thought content normal.         Judgment: Judgment normal.           ASSESSMENT AND PLAN     1. Oral candidiasis  Patient with oral thrush for 1 month. HIV screening in 2018 was normal. POCT glucose during this visit was normal.  -Patient has not obtained taking Symbicort for 1 week as his cough was no longer an issue, per patient . Advised to observe off Symbicort and reevaluate with PCP.  -Educated patient on how to take Flonase as if taking them properly can lead to medication dripping down oropharynx.   -Advised to discontinue Augmentin  - fluconazole (DIFLUCAN) 100 MG tablet; Take 1 tablet by mouth daily for 15 doses 2 tabs on the fist day then 1 tab per day after for a total of 14 days  Dispense: 15 tablet; Refill: 0    Encouraged Substitute healthy fats, such as olive oil, canola oil, grapeseed oil for saturated fats like butter, margarine, and shortening, Minimize processed foods, Minimize simple sugars and 30 minutes of exercise daily    Counseled regarding above diagnosis, including possible risks and complications, especially if left uncontrolled. Counseled regarding the possible side effects, risks, benefits and alternatives to treatment; patient and/or guardian verbalizes understanding, agrees, feels comfortable with and wishes to proceed with above treatment plan.     Call or go to ED immediately if symptoms worsen or persist. Advised patient to call with any new medication issues, and, as applicable, read all Rx info from pharmacy to assure aware of all possible risks and side effects of medication before taking. Patient and/or guardian given opportunity to ask questions/raise concerns. The patient verbalized comfort and understanding of instructions. I encourage further reading and education about your health conditions. Information on many health conditions is provided by the American Academy of Family [employer]icians: https://familydoctor. org/  Please bring any questions to me at your next visit. Medication List:    Current Outpatient Medications   Medication Sig Dispense Refill    fluconazole (DIFLUCAN) 100 MG tablet Take 1 tablet by mouth daily for 15 doses 2 tabs on the fist day then 1 tab per day after for a total of 14 days 15 tablet 0    amoxicillin-clavulanate (AUGMENTIN) 875-125 MG per tablet Take 1 tablet by mouth 2 times daily for 10 days 20 tablet 0    Ascorbic Acid (VITAMIN C) 250 MG tablet Take 250 mg by mouth daily      Cholecalciferol (VITAMIN D3) 50 MCG (2000 UT) CAPS Take by mouth 2 times daily      metoprolol succinate (TOPROL XL) 25 MG extended release tablet Take 1 tablet by mouth 2 times daily (Patient taking differently: Take 25 mg by mouth 2 times daily Pt taking 1.5 tablets BID) 60 tablet 3    clotrimazole (MYCELEX) 10 MG zackary Take 1 tablet by mouth 5 times daily for 10 days (Patient not taking: Reported on 2/7/2022) 50 tablet 0    budesonide-formoterol (SYMBICORT) 160-4.5 MCG/ACT AERO Inhale 2 puffs into the lungs 2 times daily (Patient not taking: Reported on 2/7/2022) 10.2 g 0     No current facility-administered medications for this visit. Return to Office: Return in about 1 week (around 2/14/2022) for f/u . This document may have been prepared at least partially through the use of voice recognition software.  Although effort is taken to assure the accuracy of this document, it is possible that grammatical, syntax,  or spelling errors may occur.    Joey Hoffman MD PGY-1

## 2022-03-08 ENCOUNTER — OFFICE VISIT (OUTPATIENT)
Dept: FAMILY MEDICINE CLINIC | Age: 52
End: 2022-03-08
Payer: COMMERCIAL

## 2022-03-08 VITALS
DIASTOLIC BLOOD PRESSURE: 85 MMHG | RESPIRATION RATE: 16 BRPM | HEIGHT: 64 IN | HEART RATE: 77 BPM | SYSTOLIC BLOOD PRESSURE: 133 MMHG | BODY MASS INDEX: 25.61 KG/M2 | WEIGHT: 150 LBS | TEMPERATURE: 98 F | OXYGEN SATURATION: 97 %

## 2022-03-08 DIAGNOSIS — B37.0 ORAL THRUSH: Primary | ICD-10-CM

## 2022-03-08 DIAGNOSIS — R00.2 PALPITATIONS: ICD-10-CM

## 2022-03-08 PROCEDURE — 99213 OFFICE O/P EST LOW 20 MIN: CPT | Performed by: FAMILY MEDICINE

## 2022-03-08 RX ORDER — METOPROLOL SUCCINATE 25 MG/1
37.5 TABLET, EXTENDED RELEASE ORAL DAILY
Qty: 90 TABLET | Refills: 0
Start: 2022-03-08

## 2022-03-08 ASSESSMENT — ENCOUNTER SYMPTOMS
CONSTIPATION: 0
ABDOMINAL PAIN: 0
DIARRHEA: 0
SHORTNESS OF BREATH: 0
NAUSEA: 0
VOMITING: 0
COUGH: 1

## 2022-03-08 NOTE — PROGRESS NOTES
3/8/22    Corey Brooke is a 45 y.o. male here for:    HPI:    Oral thrush   Was treated with oral for 10 days  Still sees some spots in the back of his throat  Nagging cough is now present, would like to restart his inhaler  No fever or chills   Dizziness is present with prolonged standing      BP Readings from Last 3 Encounters:   03/08/22 133/85   02/07/22 125/87   02/04/22 122/74     Current Outpatient Medications   Medication Sig Dispense Refill    metoprolol succinate (TOPROL XL) 25 MG extended release tablet Take 1.5 tablets by mouth daily 90 tablet 0    nystatin (MYCOSTATIN) 764317 UNIT/ML suspension Take 5 mLs by mouth 4 times daily for 14 days 280 mL 0    Ascorbic Acid (VITAMIN C) 250 MG tablet Take 250 mg by mouth daily      Cholecalciferol (VITAMIN D3) 50 MCG (2000 UT) CAPS Take by mouth 2 times daily       No current facility-administered medications for this visit. Allergies   Allergen Reactions    Clarithromycin     Diphenhydramine     Oxycodone     Zofran [Ondansetron Hcl] Hives    Lisinopril Rash       Past Medical & Surgical History:      Diagnosis Date    Colitis     Dr. Milagros Nicholas in [facility] Kidney stone 4/24/2013    Nephrolithiasis      Past Surgical History:   Procedure Laterality Date    CHOLECYSTECTOMY      INGUINAL HERNIA REPAIR      LITHOTRIPSY      TONSILLECTOMY         Family History:      Problem Relation Age of Onset    Heart Disease Father 46    Hypertension Father     Parkinsonism Mother 48    Heart Disease Paternal Uncle     Kidney Cancer Paternal Uncle     Cancer Maternal Aunt         bladder cancer    Heart Disease Maternal Aunt         Hypertrophy    Heart Disease Paternal Grandfather        Social History:  Social History     Tobacco Use    Smoking status: Never Smoker    Smokeless tobacco: Never Used   Substance Use Topics    Alcohol use: No         There is no immunization history on file for this patient.     Review of Systems   Constitutional: Negative for appetite change and fatigue. HENT: Negative for congestion and sneezing. Respiratory: Positive for cough. Negative for shortness of breath. Cardiovascular: Negative for chest pain. Gastrointestinal: Negative for abdominal pain, constipation, diarrhea, nausea and vomiting. Genitourinary: Negative for dysuria. Neurological: Positive for dizziness. Negative for numbness and headaches. Psychiatric/Behavioral: The patient is not nervous/anxious. VS:  /85   Pulse 77   Temp 98 °F (36.7 °C) (Temporal)   Resp 16   Ht 5' 4\" (1.626 m)   Wt 150 lb (68 kg)   SpO2 97%   BMI 25.75 kg/m²     Physical Exam  Vitals reviewed. Constitutional:       Appearance: Normal appearance. He is not ill-appearing. HENT:      Head: Normocephalic. Right Ear: Ear canal and external ear normal.      Left Ear: Ear canal and external ear normal.      Ears:      Comments: Fluid present behind her ears     Mouth/Throat:      Mouth: Mucous membranes are moist.      Pharynx: No posterior oropharyngeal erythema. Comments: White plagues present in the back of throat  Eyes:      Pupils: Pupils are equal, round, and reactive to light. Cardiovascular:      Rate and Rhythm: Normal rate and regular rhythm. Heart sounds: Normal heart sounds. No murmur heard. Pulmonary:      Effort: Pulmonary effort is normal.      Breath sounds: Normal breath sounds. No wheezing or rhonchi. Musculoskeletal:      Right lower leg: No edema. Left lower leg: No edema. Skin:     General: Skin is warm. Capillary Refill: Capillary refill takes less than 2 seconds. Coloration: Skin is not pale. Neurological:      Mental Status: He is alert and oriented to person, place, and time. Psychiatric:         Mood and Affect: Mood normal.         Behavior: Behavior normal.         Thought Content: Thought content normal.         Judgment: Judgment normal.         Assessment/Plan:  1.  Oral thrush  Will retreat. Counseled on the need to rinse mouth out after inhaler  - nystatin (MYCOSTATIN) 743916 UNIT/ML suspension; Take 5 mLs by mouth 4 times daily for 14 days  Dispense: 280 mL; Refill: 0    2. Palpitations  Medication refilled  - metoprolol succinate (TOPROL XL) 25 MG extended release tablet; Take 1.5 tablets by mouth daily  Dispense: 90 tablet; Refill: 0      Return to office: Return if symptoms worsen or fail to improve. Counseled regarding above diagnosis, including possible risks and complications, especially if left uncontrolled. I encourage you to do some reading and education about your health. The [employer] Academy [employer] provides information on many health conditions:http://familydoctor. org     Counseled regarding the possible side effects, risks, benefits and alternatives to treatment; patient and/or guardian verbalizes understanding, agrees, feels comfortable with and wishes to proceed with above treatment plan. Advised patient to call with any new medication issues, and read all Rx info from pharmacy to assure aware of all possible risks and side effects of medication before taking. Patient and/or guardian verbalizes understanding and agrees with above counseling, assessment and plan.      Bob Ramos MD  Family Medicine   PGY-3

## 2022-03-08 NOTE — PROGRESS NOTES
S: 45 y.o. male with   Chief Complaint   Patient presents with   Electwindy Penaty     1 mon follow up        White spots on tongue - thrush - was treated with flovent due to COVID. O: VS:  height is 5' 4\" (1.626 m) and weight is 150 lb (68 kg). His temporal temperature is 98 °F (36.7 °C). His blood pressure is 133/85 and his pulse is 77. His respiration is 16 and oxygen saturation is 97%. BP Readings from Last 3 Encounters:   03/08/22 133/85   02/07/22 125/87   02/04/22 122/74     See resident note      Impression/Plan:   1) Thrush - treat  2) Cough - restart Flovent - discuss mouth hygiene       Health Maintenance Due   Topic Date Due    Hepatitis C screen  Never done    COVID-19 Vaccine (1) Never done    DTaP/Tdap/Td vaccine (1 - Tdap) Never done    Colorectal Cancer Screen  Never done    Shingles Vaccine (1 of 2) Never done    Flu vaccine (1) Never done         Attending Physician Statement  I have discussed the case, including pertinent history and exam findings with the resident. I agree with the documented assessment and plan.       Romel Owens MD

## 2022-05-06 NOTE — PROGRESS NOTES
Addended by: AQUILINO TA on: 5/6/2022 03:59 PM     Modules accepted: Orders     Subjective:  Chief Complaint   Patient presents with    Cough    Diarrhea       HPI:  The patient seen on Friday through walk-in care. He had a sudden onset of acute fever, temperature max was 103. At that time, rapid COVID and rapid influenza swabs are negative. PCR was sent, but unfortunately there has been a delay. The patient continues to have respiratory symptoms, fortunately his fever is no longer present. He has coughing and congestion as well as diarrhea. He did have a small amount of bloody hemoptysis with extensive coughing       ROS:  Positive and pertinent negatives as per HPI. All other systems are reviewed and negative.        Current Outpatient Medications:     benzonatate (TESSALON) 100 MG capsule, Take 1 capsule by mouth 3 times daily as needed for Cough, Disp: 30 capsule, Rfl: 0    azithromycin (ZITHROMAX) 250 MG tablet, Take 1 tablet by mouth See Admin Instructions for 5 days 500mg on day 1 followed by 250mg on days 2 - 5, Disp: 6 tablet, Rfl: 0    nystatin (MYCOSTATIN) [id number] UNIT/ML suspension, Take 5 mLs by mouth 4 times daily for 10 days Retain in mouth as long as possible, Disp: 200 mL, Rfl: 0    promethazine (PHENERGAN) 25 MG tablet, Take 1 tablet by mouth 4 times daily as needed for Nausea, Disp: 20 tablet, Rfl: 0    Ascorbic Acid (VITAMIN C) 250 MG tablet, Take 250 mg by mouth daily, Disp: , Rfl:     Cholecalciferol (VITAMIN D3) 50 MCG (2000 UT) CAPS, Take by mouth 2 times daily, Disp: , Rfl:     metoprolol succinate (TOPROL XL) 25 MG extended release tablet, Take 1 tablet by mouth 2 times daily (Patient taking differently: Take 25 mg by mouth 2 times daily Pt taking 1.5 tablets BID), Disp: 60 tablet, Rfl: 3   Allergies   Allergen Reactions    Clarithromycin     Diphenhydramine     Oxycodone     Zofran [Ondansetron Hcl] Hives    Lisinopril Rash        Objective:  Vitals:    01/11/22 1539   BP: 138/82   Pulse: 98   Resp: 18   Temp: 97 °F (36.1 °C)   TempSrc: Temporal azithromycin for left otitis media. Nystatin is given for thrush. Again reviewed signs and symptoms of warrant immediate evaluation.   Recommend probiotic with antibiotic    Seen By:    JIM Cantu - CNP

## 2022-05-26 LAB
BASOPHILS ABSOLUTE: NORMAL
BASOPHILS RELATIVE PERCENT: NORMAL
BILIRUBIN, URINE: NORMAL
BLOOD, URINE: NORMAL
BUN BLDV-MCNC: NORMAL MG/DL
CALCIUM SERPL-MCNC: NORMAL MG/DL
CHLORIDE BLD-SCNC: NORMAL MMOL/L
CLARITY: NORMAL
CO2: NORMAL
COLOR: NORMAL
CREAT SERPL-MCNC: NORMAL MG/DL
EOSINOPHILS ABSOLUTE: NORMAL
EOSINOPHILS RELATIVE PERCENT: NORMAL
GFR CALCULATED: NORMAL
GLUCOSE BLD-MCNC: NORMAL MG/DL
GLUCOSE URINE: NORMAL
HCT VFR BLD CALC: NORMAL %
HEMOGLOBIN: NORMAL
KETONES, URINE: NORMAL
LEUKOCYTE ESTERASE, URINE: NORMAL
LYMPHOCYTES ABSOLUTE: NORMAL
LYMPHOCYTES RELATIVE PERCENT: NORMAL
MCH RBC QN AUTO: NORMAL PG
MCHC RBC AUTO-ENTMCNC: NORMAL G/DL
MCV RBC AUTO: NORMAL FL
MONOCYTES ABSOLUTE: NORMAL
MONOCYTES RELATIVE PERCENT: NORMAL
NEUTROPHILS ABSOLUTE: NORMAL
NEUTROPHILS RELATIVE PERCENT: NORMAL
NITRITE, URINE: NORMAL
PH UA: NORMAL
PLATELET # BLD: NORMAL 10*3/UL
PMV BLD AUTO: NORMAL FL
POTASSIUM SERPL-SCNC: NORMAL MMOL/L
PROTEIN UA: NORMAL
RBC # BLD: NORMAL 10*6/UL
SODIUM BLD-SCNC: NORMAL MMOL/L
SPECIFIC GRAVITY, URINE: NORMAL
UROBILINOGEN, URINE: NORMAL
WBC # BLD: NORMAL 10*3/UL

## 2022-06-16 LAB

## 2022-07-12 ENCOUNTER — OFFICE VISIT (OUTPATIENT)
Dept: FAMILY MEDICINE CLINIC | Age: 52
End: 2022-07-12
Payer: COMMERCIAL

## 2022-07-12 VITALS
OXYGEN SATURATION: 98 % | HEART RATE: 99 BPM | BODY MASS INDEX: 25.61 KG/M2 | DIASTOLIC BLOOD PRESSURE: 68 MMHG | HEIGHT: 64 IN | SYSTOLIC BLOOD PRESSURE: 124 MMHG | TEMPERATURE: 98.3 F | RESPIRATION RATE: 18 BRPM | WEIGHT: 150 LBS

## 2022-07-12 DIAGNOSIS — R30.0 DYSURIA: Primary | ICD-10-CM

## 2022-07-12 DIAGNOSIS — R82.998 DARK URINE: ICD-10-CM

## 2022-07-12 DIAGNOSIS — R30.0 DYSURIA: ICD-10-CM

## 2022-07-12 DIAGNOSIS — R05.9 COUGH: ICD-10-CM

## 2022-07-12 LAB
BILIRUBIN, POC: ABNORMAL
BLOOD URINE, POC: ABNORMAL
CLARITY, POC: CLEAR
COLOR, POC: YELLOW
GLUCOSE URINE, POC: ABNORMAL
KETONES, POC: ABNORMAL
LEUKOCYTE EST, POC: ABNORMAL
NITRITE, POC: ABNORMAL
PH, POC: 6
PROTEIN, POC: ABNORMAL
SPECIFIC GRAVITY, POC: 1.02
UROBILINOGEN, POC: 0.2

## 2022-07-12 PROCEDURE — 81002 URINALYSIS NONAUTO W/O SCOPE: CPT | Performed by: PHYSICIAN ASSISTANT

## 2022-07-12 PROCEDURE — 99213 OFFICE O/P EST LOW 20 MIN: CPT | Performed by: PHYSICIAN ASSISTANT

## 2022-07-12 RX ORDER — BENZONATATE 100 MG/1
100 CAPSULE ORAL 2 TIMES DAILY PRN
Qty: 20 CAPSULE | Refills: 0 | Status: SHIPPED | OUTPATIENT
Start: 2022-07-12 | End: 2022-07-22

## 2022-07-12 RX ORDER — CEPHALEXIN 500 MG/1
500 CAPSULE ORAL 4 TIMES DAILY
Qty: 28 CAPSULE | Refills: 0 | Status: SHIPPED | OUTPATIENT
Start: 2022-07-12 | End: 2022-07-19

## 2022-07-12 ASSESSMENT — ENCOUNTER SYMPTOMS
NAUSEA: 0
DIARRHEA: 0
ABDOMINAL PAIN: 0
PHOTOPHOBIA: 0
SHORTNESS OF BREATH: 0
VOMITING: 0
COUGH: 1
SORE THROAT: 0
BACK PAIN: 0

## 2022-07-12 NOTE — PROGRESS NOTES
22  Bridger Brown : 1970 Sex: male  Age 45 y.o. Subjective:  Chief Complaint   Patient presents with    Urinary Tract Infection         29-year-old male with a history of hypertension, colitis and dyslipidemia presents to the walk-in clinic with his wife for possible UTI. Patient states that for the last couple days he has had decreased urine output. He states what he does urinate is dark yellow with a foul odor. Patient states that he did just recently go through a bout of a kidney stone about 1 month ago. He had to have a stent. He states he was placed on an antibiotic for possible infection after the stent was taken out but he is unsure what antibiotic. He states that he did drink about 70 ounces of water yesterday but only urinated once. He is also complaining of a nagging cough for the last 3-4 days. He describes it to be dry. No shortness of breath or chest pain. No fever or chills. No sick contacts. Review of Systems   Constitutional: Negative for chills and fever. HENT: Negative for congestion, ear pain and sore throat. Eyes: Negative for photophobia and visual disturbance. Respiratory: Positive for cough. Negative for shortness of breath. Cardiovascular: Negative for chest pain. Gastrointestinal: Negative for abdominal pain, diarrhea, nausea and vomiting. Genitourinary: Positive for decreased urine volume. Negative for difficulty urinating, dysuria, frequency and urgency. Dark, foul urine   Musculoskeletal: Negative for back pain, neck pain and neck stiffness. Skin: Negative for rash. Neurological: Negative for dizziness, syncope, weakness, light-headedness and headaches. Hematological: Negative for adenopathy. Does not bruise/bleed easily. Psychiatric/Behavioral: Negative for agitation and confusion. All other systems reviewed and are negative.         PMH:     Past Medical History:   Diagnosis Date    Colitis     Dr. Volodymyr Cr in [place]  Kidney stone 4/24/2013    Nephrolithiasis        Past Surgical History:   Procedure Laterality Date    CHOLECYSTECTOMY      INGUINAL HERNIA REPAIR      LITHOTRIPSY      TONSILLECTOMY         Family History   Problem Relation Age of Onset    Heart Disease Father 46    Hypertension Father     Parkinsonism Mother 48    Heart Disease Paternal Uncle     Kidney Cancer Paternal Uncle     Cancer Maternal Aunt         bladder cancer    Heart Disease Maternal Aunt         Hypertrophy    Heart Disease Paternal Grandfather        Medications:     Current Outpatient Medications:     cephALEXin (KEFLEX) 500 MG capsule, Take 1 capsule by mouth 4 times daily for 7 days, Disp: 28 capsule, Rfl: 0    benzonatate (TESSALON) 100 MG capsule, Take 1 capsule by mouth 2 times daily as needed for Cough, Disp: 20 capsule, Rfl: 0    metoprolol succinate (TOPROL XL) 25 MG extended release tablet, Take 1.5 tablets by mouth daily, Disp: 90 tablet, Rfl: 0    Ascorbic Acid (VITAMIN C) 250 MG tablet, Take 250 mg by mouth daily, Disp: , Rfl:     Cholecalciferol (VITAMIN D3) 50 MCG (2000 UT) CAPS, Take by mouth 2 times daily, Disp: , Rfl:     Allergies: Allergies   Allergen Reactions    Acetaminophen Hives    Clarithromycin     Diphenhydramine     Oxycodone     Zofran [Ondansetron Hcl] Hives    Lisinopril Rash       Social History:     Social History     Tobacco Use    Smoking status: Never Smoker    Smokeless tobacco: Never Used   Vaping Use    Vaping Use: Never used   Substance Use Topics    Alcohol use: No    Drug use: No       Patient lives at home. Physical Exam:     Vitals:    07/12/22 1626   BP: 124/68   Pulse: 99   Resp: 18   Temp: 98.3 °F (36.8 °C)   TempSrc: Temporal   SpO2: 98%   Weight: 150 lb (68 kg)   Height: 5' 4\" (1.626 m)       Exam:  Physical Exam  Vitals and nursing note reviewed. Constitutional:       General: He is not in acute distress. Appearance: He is well-developed.    HENT: Head: Normocephalic and atraumatic. Right Ear: Tympanic membrane normal.      Left Ear: Tympanic membrane normal.      Nose: Nose normal.      Mouth/Throat:      Mouth: Mucous membranes are moist.      Pharynx: Oropharynx is clear. Eyes:      Conjunctiva/sclera: Conjunctivae normal.      Pupils: Pupils are equal, round, and reactive to light. Cardiovascular:      Rate and Rhythm: Normal rate and regular rhythm. Pulmonary:      Effort: Pulmonary effort is normal. No respiratory distress. Breath sounds: Normal breath sounds. Abdominal:      General: Bowel sounds are normal. There is no distension. Palpations: Abdomen is soft. Tenderness: There is no abdominal tenderness. There is no right CVA tenderness or left CVA tenderness. Musculoskeletal:         General: Normal range of motion. Cervical back: Normal range of motion. No rigidity. Lymphadenopathy:      Cervical: No cervical adenopathy. Skin:     General: Skin is warm and dry. Neurological:      General: No focal deficit present. Mental Status: He is alert and oriented to person, place, and time. Psychiatric:         Mood and Affect: Mood normal.         Behavior: Behavior normal.         Thought Content: Thought content normal.         Judgment: Judgment normal.           Testing:     Results for orders placed or performed in visit on 07/12/22   POCT Urinalysis no Micro   Result Value Ref Range    Color, UA yellow     Clarity, UA clear     Glucose, UA POC neg     Bilirubin, UA neg     Ketones, UA neg     Spec Grav, UA 1.020     Blood, UA POC small     pH, UA 6.0     Protein, UA POC neg     Urobilinogen, UA 0.2     Leukocytes, UA neg     Nitrite, UA neg            Medical Decision Making:       Patient upon arrival did not appear toxic or lethargic. Vital signs were reviewed. Past medical history reviewed. Allergies reviewed. Medications reviewed.      Patient is presenting with the above complaint of UTI symptoms. Urine dip reveals negative leukocytes and small blood. The patient did recently passed a kidney stone. Urine culture is pending. Patient will be empirically treated with cefdinir. Patient is to drink plenty of fluids. He will also be given a prescription for Tessalon Perles for his cough. He was offered Medrol Dosepak but states he does not tolerate steroids very well. He is to monitor his signs and symptoms and follow-up with his PCP. If urinary symptoms persist I recommended following up with his urologist.  We discussed signs and symptoms that would warrant emergent evaluation the emergency department. Patient understands plan is agreeable. Clinical Impression:   Leatha Perez was seen today for urinary tract infection. Diagnoses and all orders for this visit:    Dysuria  -     POCT Urinalysis no Micro  -     Culture, Urine; Future    Dark urine    Cough    Other orders  -     cephALEXin (KEFLEX) 500 MG capsule; Take 1 capsule by mouth 4 times daily for 7 days  -     benzonatate (TESSALON) 100 MG capsule; Take 1 capsule by mouth 2 times daily as needed for Cough        The patient is to call for any concerns or return if any of the signs or symptoms worsen. The patient is to follow-up with PCP in the next 2-3 days for repeat evaluation repeat assessment or go directly to the emergency department. SIGNATURE: [place] PA-C

## 2022-07-15 LAB — URINE CULTURE, ROUTINE: NORMAL

## 2022-08-04 ENCOUNTER — OFFICE VISIT (OUTPATIENT)
Dept: PRIMARY CARE CLINIC | Age: 52
End: 2022-08-04
Payer: COMMERCIAL

## 2022-08-04 VITALS
OXYGEN SATURATION: 98 % | SYSTOLIC BLOOD PRESSURE: 122 MMHG | DIASTOLIC BLOOD PRESSURE: 80 MMHG | TEMPERATURE: 96.9 F | HEIGHT: 64 IN | BODY MASS INDEX: 25.1 KG/M2 | WEIGHT: 147 LBS | RESPIRATION RATE: 16 BRPM | HEART RATE: 69 BPM

## 2022-08-04 DIAGNOSIS — L98.9 SKIN LESION: ICD-10-CM

## 2022-08-04 DIAGNOSIS — J31.0 CHRONIC RHINITIS: ICD-10-CM

## 2022-08-04 DIAGNOSIS — R05.3 CHRONIC COUGH: ICD-10-CM

## 2022-08-04 DIAGNOSIS — Z12.11 COLON CANCER SCREENING: Primary | ICD-10-CM

## 2022-08-04 PROCEDURE — 99214 OFFICE O/P EST MOD 30 MIN: CPT | Performed by: STUDENT IN AN ORGANIZED HEALTH CARE EDUCATION/TRAINING PROGRAM

## 2022-08-04 RX ORDER — LEVOCETIRIZINE DIHYDROCHLORIDE 5 MG/1
5 TABLET, FILM COATED ORAL NIGHTLY
Qty: 30 TABLET | Refills: 5 | Status: SHIPPED | OUTPATIENT
Start: 2022-08-04

## 2022-08-04 RX ORDER — BUDESONIDE AND FORMOTEROL FUMARATE DIHYDRATE 160; 4.5 UG/1; UG/1
2 AEROSOL RESPIRATORY (INHALATION) 2 TIMES DAILY
Qty: 1 EACH | Refills: 3 | Status: SHIPPED | OUTPATIENT
Start: 2022-08-04

## 2022-08-04 RX ORDER — TRIAMCINOLONE ACETONIDE 55 UG/1
2 SPRAY, METERED NASAL DAILY
Qty: 1 EACH | Refills: 5 | Status: SHIPPED | OUTPATIENT
Start: 2022-08-04

## 2022-08-04 SDOH — HEALTH STABILITY: PHYSICAL HEALTH: ON AVERAGE, HOW MANY MINUTES DO YOU ENGAGE IN EXERCISE AT THIS LEVEL?: 10 MIN

## 2022-08-04 ASSESSMENT — SOCIAL DETERMINANTS OF HEALTH (SDOH)

## 2022-08-04 ASSESSMENT — PATIENT HEALTH QUESTIONNAIRE - PHQ9
1. LITTLE INTEREST OR PLEASURE IN DOING THINGS: 1
SUM OF ALL RESPONSES TO PHQ QUESTIONS 1-9: 1
SUM OF ALL RESPONSES TO PHQ9 QUESTIONS 1 & 2: 1
SUM OF ALL RESPONSES TO PHQ QUESTIONS 1-9: 1
SUM OF ALL RESPONSES TO PHQ QUESTIONS 1-9: 1
2. FEELING DOWN, DEPRESSED OR HOPELESS: 0
SUM OF ALL RESPONSES TO PHQ QUESTIONS 1-9: 1

## 2022-08-04 NOTE — PROGRESS NOTES
[facility]  Department of Family Medicine      Patient:  Kalina Pressley 45 y.o. male     Date of Service: 8/4/22      Chief complaint:   Chief Complaint   Patient presents with    Establish Care         History ofPresent Illness   The patient is a 45 y.o. male  presented to the clinic with complaints as above.     Here to establish    Lesion on left shoulder area  -brown, looks dry and scaly  -has not grown in size  -denies any discharge  -has been there probably his whole life     Hx of chronic cough  -on symbicort and feels like it did help       Past Medical History:      Diagnosis Date    Colitis     Dr. Mary Jo Taylor in [place]     Kidney stone 4/24/2013    Nephrolithiasis        PastSurgical History:        Procedure Laterality Date    CHOLECYSTECTOMY      INGUINAL HERNIA REPAIR      LITHOTRIPSY      TONSILLECTOMY         Allergies:    Acetaminophen, Clarithromycin, Diphenhydramine, Oxycodone, Zofran [ondansetron hcl], and Lisinopril    Social History:   Social History     Socioeconomic History    Marital status:      Spouse name: Not on file    Number of children: Not on file    Years of education: Not on file    Highest education level: Not on file   Occupational History    Not on file   Tobacco Use    Smoking status: Never    Smokeless tobacco: Never   Vaping Use    Vaping Use: Never used   Substance and Sexual Activity    Alcohol use: No    Drug use: No    Sexual activity: Not on file   Other Topics Concern    Not on file   Social History Narrative    Not on file     Social Determinants of Health     Financial Resource Strain: Low Risk     Difficulty of Paying Living Expenses: Not hard at all   Food Insecurity: No Food Insecurity    Worried About Running Out of Food in the Last Year: Never true    Ran Out of Food in the Last Year: Never true   Transportation Needs: Not on file   Physical Activity: Unknown    Days of Exercise per Week: Not on file    Minutes of Exercise per Session: 10 min   Stress: Not on file   Social Connections: Not on file   Intimate Partner Violence: Not At Risk    Fear of Current or Ex-Partner: No    Emotionally Abused: No    Physically Abused: No    Sexually Abused: No   Housing Stability: Not on file        Family History:       Problem Relation Age of Onset    Heart Disease Father 46    Hypertension Father     Parkinsonism Mother 48    Heart Disease Paternal Uncle     Kidney Cancer Paternal Uncle     Cancer Maternal Aunt         bladder cancer    Heart Disease Maternal Aunt         Hypertrophy    Heart Disease Paternal Grandfather        Review of Systems:   Review of Systems - as above     Physical Exam   Vitals: /80   Pulse 69   Temp 96.9 °F (36.1 °C) (Infrared)   Resp 16   Ht 5' 4\" (1.626 m)   Wt 147 lb (66.7 kg)   SpO2 98%   BMI 25.23 kg/m²   Physical Exam  Constitutional:       Appearance: He is well-developed. HENT:      Head: Normocephalic and atraumatic. Nose: Mucosal edema and rhinorrhea present. Rhinorrhea is clear. Right Turbinates: Enlarged and swollen. Left Turbinates: Enlarged and swollen. Eyes:      General:         Right eye: No discharge. Left eye: No discharge. Conjunctiva/sclera: Conjunctivae normal.   Neck:      Trachea: No tracheal deviation. Cardiovascular:      Rate and Rhythm: Normal rate and regular rhythm. Heart sounds: Normal heart sounds. Pulmonary:      Effort: Pulmonary effort is normal. No respiratory distress. Breath sounds: Normal breath sounds. No wheezing. Abdominal:      General: Bowel sounds are normal. There is no distension. Palpations: Abdomen is soft. Tenderness: There is no abdominal tenderness. Musculoskeletal:         General: No tenderness. Cervical back: Normal range of motion and neck supple. Skin:     General: Skin is warm and dry. Neurological:      Mental Status: He is alert.    Psychiatric:         Behavior: Behavior normal. Assessment and Plan       1. Chronic cough  F/u of chronic issue  -Unclear etiology, will get PFT's for further evaluation and treat allergies also with below modalities  - budesonide-formoterol (SYMBICORT) 160-4.5 MCG/ACT AERO; Inhale 2 puffs into the lungs in the morning and 2 puffs before bedtime. Dispense: 1 each; Refill: 3  -Close f/u in 1 month   - Full PFT Study With Bronchodilator; Future  - triamcinolone (NASACORT) 55 MCG/ACT nasal inhaler; 2 sprays by Each Nostril route in the morning. Dispense: 1 each; Refill: 5  - levocetirizine (XYZAL) 5 MG tablet; Take 1 tablet by mouth nightly  Dispense: 30 tablet; Refill: 5    2. Chronic rhinitis  F/u of chronic issue  -Will treat to see if this helps his cough   - triamcinolone (NASACORT) 55 MCG/ACT nasal inhaler; 2 sprays by Each Nostril route in the morning. Dispense: 1 each; Refill: 5  - levocetirizine (XYZAL) 5 MG tablet; Take 1 tablet by mouth nightly  Dispense: 30 tablet; Refill: 5    3. Skin lesion  Chronic issue  -Looks benign however advised he see dermatology    4. Colon cancer screening  HCM:  - [address], MD, General Surgery, [address] regarding above diagnosis, including possible risks and complications,  especially if left uncontrolled. Counseled regarding the possible side effects, risks, benefits and alternatives to treatment;patient and/or guardian verbalizes understanding, agrees, feels comfortable with and wishes to proceed with above treatment plan. Call or go to [address] [facility] if symptoms worsen or persist. Advised patient to call with any new medication issues, and, as applicable, read all Rx info from pharmacy to assure aware of all possible risks and side effects of medicationbefore taking. Patient and/or guardian given opportunity to ask questions/raise concerns. The patient verbalized comfort and understanding ofinstructions.     I encourage further reading and education about your health conditions. Information on many health conditions is provided by [facility] [employer]: https://familydoctor. org/  Please bring any questions to me at your nextvisit. Return to Office: Return in about 1 month (around 9/4/2022) for f/u cough . Medication List:    Current Outpatient Medications   Medication Sig Dispense Refill    budesonide-formoterol (SYMBICORT) 160-4.5 MCG/ACT AERO Inhale 2 puffs into the lungs in the morning and 2 puffs before bedtime. 1 each 3    triamcinolone (NASACORT) 55 MCG/ACT nasal inhaler 2 sprays by Each Nostril route in the morning. 1 each 5    levocetirizine (XYZAL) 5 MG tablet Take 1 tablet by mouth nightly 30 tablet 5    metoprolol succinate (TOPROL XL) 25 MG extended release tablet Take 1.5 tablets by mouth daily 90 tablet 0    Ascorbic Acid (VITAMIN C) 250 MG tablet Take 250 mg by mouth daily (Patient not taking: Reported on 8/4/2022)      Cholecalciferol (VITAMIN D3) 50 MCG (2000 UT) CAPS Take by mouth 2 times daily (Patient not taking: Reported on 8/4/2022)       No current facility-administered medications for this visit. Domi Mar, DO       This document may have been prepared at least partially through the use of voice recognition software. Although effort is taken to assure the accuracy ofthis document, it is possible that grammatical, syntax,  or spelling errors may occur.

## 2022-09-09 ENCOUNTER — OFFICE VISIT (OUTPATIENT)
Dept: SURGERY | Age: 52
End: 2022-09-09
Payer: COMMERCIAL

## 2022-09-09 VITALS
TEMPERATURE: 97.2 F | WEIGHT: 147 LBS | BODY MASS INDEX: 25.1 KG/M2 | OXYGEN SATURATION: 97 % | HEART RATE: 91 BPM | RESPIRATION RATE: 18 BRPM | SYSTOLIC BLOOD PRESSURE: 130 MMHG | HEIGHT: 64 IN | DIASTOLIC BLOOD PRESSURE: 86 MMHG

## 2022-09-09 DIAGNOSIS — K29.00 OTHER ACUTE GASTRITIS WITHOUT HEMORRHAGE: Primary | ICD-10-CM

## 2022-09-09 DIAGNOSIS — Z12.11 ENCOUNTER FOR SCREENING COLONOSCOPY: ICD-10-CM

## 2022-09-09 PROCEDURE — 99203 OFFICE O/P NEW LOW 30 MIN: CPT | Performed by: SURGERY

## 2022-09-09 RX ORDER — OMEPRAZOLE 20 MG/1
20 TABLET, DELAYED RELEASE ORAL DAILY
Qty: 30 TABLET | Refills: 3 | Status: SHIPPED | OUTPATIENT
Start: 2022-09-09

## 2022-09-09 NOTE — PROGRESS NOTES
[facility] Surgery Clinic Note    Assessment/Plan:      Diagnosis Orders   1. GERD; acute gastritis without hemorrhage  omeprazole (PRILOSEC OTC) 20 MG tablet    Trial PPI. We will plan for EGD evaluation if no better      2. Encounter for screening colonoscopy      We will plan for colonoscopy            Return for Endoscopy. Chief Complaint   Patient presents with    New Patient     Colon cancer screening        PCP: Kendra Brannon DO    HPI: Juliana Bustamante is a 47 y.o. male who presents in consultation for epigastric pain. This been ongoing for about 3 weeks. He does have a history of cholecystectomy. He does complain of some reflux. He takes an occasional antacid but nothing consistently. He had an EGD in 2009 showing peptic ulcer disease he reports. He denies any melena. There is no dysphagia. He is also due for colonoscopy. He has not had one previously. He has no constipation or diarrhea. There is no hematochezia. There is no family history of colon cancer or inflammatory bowel disease. He is on Toprol for history of \"inappropriate sinus tachycardia. \"  He follows with cardiology at the [facility] clinic. Past Medical History:   Diagnosis Date    Colitis     Dr. Natacha Gamboa in [place]     Kidney stone 4/24/2013    Nephrolithiasis        Past Surgical History:   Procedure Laterality Date    CHOLECYSTECTOMY      INGUINAL HERNIA REPAIR      LITHOTRIPSY      TONSILLECTOMY         Prior to Admission medications    Medication Sig Start Date End Date Taking? Authorizing Provider   omeprazole (PRILOSEC OTC) 20 MG tablet Take 1 tablet by mouth daily 9/9/22  Yes Amadeo Ochoa MD   budesonide-formoterol (SYMBICORT) 160-4.5 MCG/ACT AERO Inhale 2 puffs into the lungs in the morning and 2 puffs before bedtime. 8/4/22  Yes Dennis Andrade DO   triamcinolone (NASACORT) 55 MCG/ACT nasal inhaler 2 sprays by Each Nostril route in the morning.  8/4/22  Yes Jenna Andrade,    levocetirizine (XYZAL) 5 °F (36.2 °C)   TempSrc: Temporal   SpO2: 97%   Weight: 147 lb (66.7 kg)   Height: 5' 4\" (1.626 m)          Physical Exam  Constitutional:       General: He is not in acute distress. Appearance: He is not diaphoretic. HENT:      Head: Normocephalic and atraumatic. Eyes:      General:         Right eye: No discharge. Left eye: No discharge. Neck:      Trachea: No tracheal deviation. Cardiovascular:      Rate and Rhythm: Normal rate. Pulmonary:      Effort: Pulmonary effort is normal. No respiratory distress. Abdominal:      General: There is no distension. Palpations: Abdomen is soft. Tenderness: There is no abdominal tenderness. There is no guarding or rebound. Skin:     General: Skin is warm and dry. Neurological:      Mental Status: He is alert and oriented to person, place, and time. Chinedu Galvan MD      NOTE: This report, in part or full,may have been transcribed using voice recognition software. Every effort was made to ensure accuracy; however, inadvertent computerized transcription errors may be present. Please excuse any transcriptional grammatical or spelling errors that may have escaped my editorial review.     CC: Domi Mar, DO

## 2022-09-15 ENCOUNTER — TELEPHONE (OUTPATIENT)
Dept: SURGERY | Age: 52
End: 2022-09-15

## 2022-09-15 NOTE — TELEPHONE ENCOUNTER
Malaika Lr is scheduled for EGD/colonoscopy with Dr Britney Bradshaw on 01-24-23 at [facility] at 9:00 am. Patient was told to arrive at 8:00 am. Patient needs to be NPO after midnight the night before procedure. All surgery instructions were explained to the patient and a surgery letter was also mailed out. MA informed patient that PAT will also be calling to review pre-op instructions and medications. Patient verbalized understanding. Patient was offered an earlier date but declined.   Electronically signed by Noam Randle MA on 9/15/2022 at 7:25 AM

## 2022-09-15 NOTE — TELEPHONE ENCOUNTER
Prior Authorization Form:      DEMOGRAPHICS:                     Patient Name:  Benji Adams  Patient :  1970            Insurance:  Payor: Mar[facility] Amezc[facility] / Plan: [address] [place] [address] / Product Type: *No Product type* /   Insurance ID Number:    Payer/Plan Subscr  Sex Relation Sub. Ins. ID Effective Group Num   1.  [address] A 1970 Male Self [id number]                                    [id number]         DIAGNOSIS & PROCEDURE:                       Procedure/Operation: EGD/colonoscopy           CPT Code: 29880/30380    Diagnosis:  GERD/acute gastritis/screening    ICD10 Code: K21.9/K29.02/Z12.11    Location:  [facility]    Surgeon:  Dr Nicole Gonzalez INFORMATION:                          Date: 23    Time: 9:00 am              Anesthesia:  [facility]                                                       Status:  Outpatient        Special Comments:         Electronically signed by Evelio Melissa MA on 9/15/2022 at 7:26 AM

## 2022-10-04 DIAGNOSIS — F41.0 PANIC ATTACKS: ICD-10-CM

## 2022-10-04 RX ORDER — HYDROXYZINE PAMOATE 25 MG/1
25 CAPSULE ORAL 3 TIMES DAILY PRN
Qty: 30 CAPSULE | Refills: 0 | Status: SHIPPED | OUTPATIENT
Start: 2022-10-04 | End: 2022-10-18

## 2022-10-04 NOTE — TELEPHONE ENCOUNTER
hydrOXYzine (VISTARIL) 25 MG capsule     [address] [phone] Fareed Antoine [phone]   [address] [place] [id number]   Phone:  [phone]  Fax:  [phone]

## 2022-11-06 DIAGNOSIS — F41.0 PANIC ATTACKS: ICD-10-CM

## 2022-11-07 RX ORDER — HYDROXYZINE PAMOATE 25 MG/1
CAPSULE ORAL
Qty: 30 CAPSULE | Refills: 3 | Status: SHIPPED | OUTPATIENT
Start: 2022-11-07

## 2022-11-18 ENCOUNTER — TELEPHONE (OUTPATIENT)
Dept: NON INVASIVE DIAGNOSTICS | Age: 52
End: 2022-11-18

## 2022-11-18 NOTE — TELEPHONE ENCOUNTER
Left message to call the office back    Overdue 6 mo OV (05/31/2022) No device no AAD / [facility]

## 2022-12-28 DIAGNOSIS — F41.0 PANIC ATTACKS: ICD-10-CM

## 2022-12-28 RX ORDER — HYDROXYZINE PAMOATE 25 MG/1
CAPSULE ORAL
Qty: 90 CAPSULE | Refills: 0 | Status: SHIPPED | OUTPATIENT
Start: 2022-12-28

## 2023-01-20 NOTE — PROGRESS NOTES
Dr Kaleigh Plata notified pt has hx of tachycardia follows cardiology at [facility]. Turned in holter monitor 1/16/23- left message with Ale Chavez at [facility] cardiology \"results pending\" and will forward to us once reported. Per Dr Kaleigh Plata procedure to be rescheduled if results not back by DOS. Pt instructed to stop supplement \"cortisol manager\" per Dr Kaleigh Plata.

## 2023-01-20 NOTE — PROGRESS NOTES
Gm PRE-ADMISSION TESTING INSTRUCTIONS      ARRIVAL INSTRUCTIONS:   Parking the day of Surgery is located in the Main E[x]ntrance lot. Upon entering the main door make an immediate right to the surgery reception desk. [x] Bring photo ID and insurance card    [] Bring in a copy of Living will or Durable Power of  papers. [x] Please be sure to arrange for responsible adult to provide transportation to and from the hospital    [x] Please arrange for responsible adult to be with you for the 24 hour period post procedure due to having anesthesia    [x] If you awake am of surgery not feeling well or have temperature >100 please call [phone]    GENERAL INSTRUCTIONS:    [x] Nothing by mouth after midnight, including gum, candy, mints or water    [x] You may brush your teeth, but do not swallow any water    [x] Take medications as instructed with 1-2 oz of water     Stop herbal supplements and vitamins 5 days prior to p[x]rocedure    [x] Follow preop dosing of blood thinners per physician instructions    [] Take 1/2 dose of evening insulin, but no insulin after midnight    [] No oral diabetic medications after midnight    [] If diabetic and have low blood sugar or feel symptomatic, take 1-2oz apple juice only    [] Bring inhalers day of surgery    [] Bring C-PAP/ Bi-Pap day of surgery    [] Bring urine specimen day of surgery    [x] Shower or bath with soap, lather and rinse well, AM of Surgery, no lotion, powders or creams to surgical site    [x] Follow bowel prep as instructed per surgeon    [x] No tobacco products within 24 hours of surgery     [x] No alcohol or illegal drug use within 24 hours of surgery.     [x] Jewelry, body piercing's, eyeglasses, contact lenses and dentures are not permitted into surgery (bring cases)      [] Please do not wear any nail polish, make up or hair products on the day of surgery    [x] You can expect a call the business day prior to procedure to notify you if your arrival time changes    [x] If you receive a survey after surgery we would greatly appreciate your comments    [x] Please notify surgeon if you develop any illness between now and time of surgery (cold, cough, sore throat, fever, nausea, vomiting) or any signs of infections  including skin, wounds, and dental.    []  The Outpatient Pharmacy is available to fill your prescription here on your day of surgery, ask your preop nurse for details

## 2023-01-23 ENCOUNTER — ANESTHESIA EVENT (OUTPATIENT)
Dept: ENDOSCOPY | Age: 53
End: 2023-01-23
Payer: COMMERCIAL

## 2023-01-23 ASSESSMENT — LIFESTYLE VARIABLES: SMOKING_STATUS: 0

## 2023-01-23 NOTE — PROGRESS NOTES
Preliminary results from Holter monitor received from [facility]. Paper document with chart.

## 2023-01-23 NOTE — ANESTHESIA PRE PROCEDURE
Department of Anesthesiology  Preprocedure Note       Name:  Tess Tate   Age:  47 y.o.  :  1970                                          MRN:  [id number]         Date:  2023      Surgeon: Ale Ivy):  Marybeth Stoddard MD    Procedure: Procedure(s):  EGD ESOPHAGOGASTRODUODENOSCOPY  COLORECTAL CANCER SCREENING, NOT HIGH RISK    Medications prior to admission:   Prior to Admission medications    Medication Sig Start Date End Date Taking? Authorizing Provider   Misc Natural Products (CORTISOL MANAGER) TABS Take by mouth at bedtime Indications: pt reports taking due to \" stress hormones too high\"   Yes Historical Provider, MD   hydrOXYzine pamoate (VISTARIL) 25 MG capsule TAKE 1 CAPSULE BY MOUTH THREE TIMES DAILY AS NEEDED FOR ANXIETY 22   Fritz Andrade DO   omeprazole (PRILOSEC OTC) 20 MG tablet Take 1 tablet by mouth daily 22   Marybeth Stoddard MD   budesonide-formoterol (SYMBICORT) 160-4.5 MCG/ACT AERO Inhale 2 puffs into the lungs in the morning and 2 puffs before bedtime. Patient not taking: Reported on 2023   Fritz Andrade DO   metoprolol succinate (TOPROL XL) 25 MG extended release tablet Take 1.5 tablets by mouth daily 3/8/22   Jenna Hidalgo MD   Ascorbic Acid (VITAMIN C) 250 MG tablet Take 250 mg by mouth daily    Historical Provider, MD   Cholecalciferol (VITAMIN D3) 50 MCG ( UT) CAPS Take by mouth 2 times daily    Historical Provider, MD       Current medications:    No current facility-administered medications for this encounter.      Current Outpatient Medications   Medication Sig Dispense Refill    Misc Natural Products (CORTISOL MANAGER) TABS Take by mouth at bedtime Indications: pt reports taking due to \" stress hormones too high\"      hydrOXYzine pamoate (VISTARIL) 25 MG capsule TAKE 1 CAPSULE BY MOUTH THREE TIMES DAILY AS NEEDED FOR ANXIETY 90 capsule 0    omeprazole (PRILOSEC OTC) 20 MG tablet Take 1 tablet by mouth daily 30 tablet 3    budesonide-formoterol (SYMBICORT) 160-4.5 MCG/ACT AERO Inhale 2 puffs into the lungs in the morning and 2 puffs before bedtime. (Patient not taking: Reported on 1/20/2023) 1 each 3    metoprolol succinate (TOPROL XL) 25 MG extended release tablet Take 1.5 tablets by mouth daily 90 tablet 0    Ascorbic Acid (VITAMIN C) 250 MG tablet Take 250 mg by mouth daily      Cholecalciferol (VITAMIN D3) 50 MCG (2000 UT) CAPS Take by mouth 2 times daily         Allergies: Allergies   Allergen Reactions    Clarithromycin Nausea Only    Diphenhydramine Hives    Oxycodone Hives    Lisinopril Rash    Zofran [Ondansetron Hcl] Hives       Problem List:    Patient Active Problem List   Diagnosis Code    Nevus D22.9    Colitis K52.9    Dyslipidemia E78.5    Palpitations R00.2    Essential hypertension I10    Chronic cough R05.3    Chronic rhinitis J31.0    Skin lesion L98.9       Past Medical History:        Diagnosis Date    GERD (gastroesophageal reflux disease)     Kidney stone 04/24/2013    Tachycardia     follows with [facility]. holter monitor-results pending       Past Surgical History:        Procedure Laterality Date    CHOLECYSTECTOMY      1996    INGUINAL HERNIA REPAIR      LITHOTRIPSY      TONSILLECTOMY         Social History:    Social History     Tobacco Use    Smoking status: Never    Smokeless tobacco: Never   Substance Use Topics    Alcohol use:  No                                Counseling given: Not Answered      Vital Signs (Current):   Vitals:    01/20/23 1012   Weight: 150 lb (68 kg)   Height: 5' 4\" (1.626 m)                                              BP Readings from Last 3 Encounters:   09/09/22 130/86   08/04/22 122/80   07/12/22 124/68       NPO Status:                                                                                 BMI:   Wt Readings from Last 3 Encounters:   09/09/22 147 lb (66.7 kg)   08/04/22 147 lb (66.7 kg)   07/12/22 150 lb (68 kg)     Body mass index is 25.75 kg/m².    CBC:   Lab Results   Component Value Date/Time    WBC 8.4 12/04/2021 11:00 AM    RBC 5.42 12/04/2021 11:00 AM    HGB 16.1 12/04/2021 11:00 AM    HCT 48.6 12/04/2021 11:00 AM    MCV 89.7 12/04/2021 11:00 AM    RDW 13.1 12/04/2021 11:00 AM     12/04/2021 11:00 AM       CMP:   Lab Results   Component Value Date/Time     12/04/2021 11:00 AM    K 4.3 12/04/2021 11:00 AM     12/04/2021 11:00 AM    CO2 25 12/04/2021 11:00 AM    BUN 16 12/04/2021 11:00 AM    CREATININE 1.0 12/04/2021 11:00 AM    GFRAA >60 12/04/2021 11:00 AM    LABGLOM >60 12/04/2021 11:00 AM    GLUCOSE 127 12/04/2021 11:00 AM    PROT 8.5 12/04/2021 11:00 AM    CALCIUM 9.8 12/04/2021 11:00 AM    BILITOT 0.5 12/04/2021 11:00 AM    ALKPHOS 92 12/04/2021 11:00 AM    AST 23 12/04/2021 11:00 AM    ALT 35 12/04/2021 11:00 AM       POC Tests: No results for input(s): POCGLU, POCNA, POCK, POCCL, POCBUN, POCHEMO, POCHCT in the last 72 hours.     Coags: No results found for: PROTIME, INR, APTT    HCG (If Applicable): No results found for: PREGTESTUR, PREGSERUM, HCG, HCGQUANT     ABGs: No results found for: PHART, PO2ART, CWD2OXW, VEK3ZHB, BEART, E3IKRIRP     Type & Screen (If Applicable):  No results found for: LABABO, LABRH    Drug/Infectious Status (If Applicable):  No results found for: HIV, HEPCAB    COVID-19 Screening (If Applicable):   Lab Results   Component Value Date/Time    COVID19 Positive 01/11/2022 04:06 PM    COVID19 Not Detected 01/07/2022 10:24 AM    COVID19 Not-Detected 01/07/2022 10:01 AM           Anesthesia Evaluation  Patient summary reviewed and Nursing notes reviewed no history of anesthetic complications:   Airway: Mallampati: III  TM distance: >3 FB   Neck ROM: full  Mouth opening: > = 3 FB   Dental:    (+) edentulous      Pulmonary:normal exam  breath sounds clear to auscultation      (-) sleep apnea and not a current smoker                          ROS comment: Chronic cough  Chronic rhinitis Cardiovascular:  Exercise tolerance: good (>4 METS),   (+) hypertension: mild and no interval change, dysrhythmias (Palpitations and tachycardia with limited exercise tolerance):, ACEVEDO: after ambulating 1 flight of stairs,     (-) past MI, CAD, CABG/stent and  angina    ECG reviewed  Rhythm: regular  Rate: normal           Beta Blocker:  Dose within 24 Hrs      ROS comment: EKG:  Sinus  Rhythm   WITHIN NORMAL LIMITS     Neuro/Psych:   Negative Neuro/Psych ROS     (-) seizures, TIA and CVA           GI/Hepatic/Renal:   (+) GERD: no interval change, renal disease: kidney stones, bowel prep,      (-) hepatitis      ROS comment: History of colitis. Endo/Other: Negative Endo/Other ROS       (-) diabetes mellitus, blood dyscrasia        Pt had no PAT visit       Abdominal:         (-) obese       Vascular: negative vascular ROS. - DVT and PE. Other Findings:           Anesthesia Plan      MAC     ASA 3     (CRCS with history of gastritis/GERD )  Induction: intravenous. Anesthetic plan and risks discussed with patient. Use of blood products discussed with patient whom consented to blood products. Plan discussed with attending.                 Lois Wadsworth DO   1/23/2023

## 2023-01-24 ENCOUNTER — HOSPITAL ENCOUNTER (OUTPATIENT)
Age: 53
Setting detail: OUTPATIENT SURGERY
Discharge: HOME | End: 2023-01-24
Attending: SURGERY | Admitting: SURGERY
Payer: COMMERCIAL

## 2023-01-24 ENCOUNTER — ANESTHESIA (OUTPATIENT)
Dept: ENDOSCOPY | Age: 53
End: 2023-01-24
Payer: COMMERCIAL

## 2023-01-24 VITALS
TEMPERATURE: 97.2 F | RESPIRATION RATE: 18 BRPM | OXYGEN SATURATION: 96 % | HEART RATE: 76 BPM | SYSTOLIC BLOOD PRESSURE: 123 MMHG | DIASTOLIC BLOOD PRESSURE: 74 MMHG | WEIGHT: 150 LBS | HEIGHT: 64 IN | BODY MASS INDEX: 25.61 KG/M2

## 2023-01-24 DIAGNOSIS — Z12.11 SPECIAL SCREENING FOR MALIGNANT NEOPLASMS, COLON: ICD-10-CM

## 2023-01-24 DIAGNOSIS — K21.9 GASTROESOPHAGEAL REFLUX DISEASE WITHOUT ESOPHAGITIS: ICD-10-CM

## 2023-01-24 PROCEDURE — 6360000002 HC RX W HCPCS: Performed by: NURSE ANESTHETIST, CERTIFIED REGISTERED

## 2023-01-24 PROCEDURE — 3700000001 HC ADD 15 MINUTES (ANESTHESIA): Performed by: SURGERY

## 2023-01-24 PROCEDURE — 88305 TISSUE EXAM BY PATHOLOGIST: CPT

## 2023-01-24 PROCEDURE — 2709999900 HC NON-CHARGEABLE SUPPLY: Performed by: SURGERY

## 2023-01-24 PROCEDURE — 3700000000 HC ANESTHESIA ATTENDED CARE: Performed by: SURGERY

## 2023-01-24 PROCEDURE — 2580000003 HC RX 258: Performed by: NURSE ANESTHETIST, CERTIFIED REGISTERED

## 2023-01-24 PROCEDURE — 3609027000 HC COLONOSCOPY: Performed by: SURGERY

## 2023-01-24 PROCEDURE — 7100000011 HC PHASE II RECOVERY - ADDTL 15 MIN: Performed by: SURGERY

## 2023-01-24 PROCEDURE — 43239 EGD BIOPSY SINGLE/MULTIPLE: CPT | Performed by: SURGERY

## 2023-01-24 PROCEDURE — 88312 SPECIAL STAINS GROUP 1: CPT

## 2023-01-24 PROCEDURE — 88342 IMHCHEM/IMCYTCHM 1ST ANTB: CPT

## 2023-01-24 PROCEDURE — 45378 DIAGNOSTIC COLONOSCOPY: CPT | Performed by: SURGERY

## 2023-01-24 PROCEDURE — 7100000010 HC PHASE II RECOVERY - FIRST 15 MIN: Performed by: SURGERY

## 2023-01-24 PROCEDURE — 3609012400 HC EGD TRANSORAL BIOPSY SINGLE/MULTIPLE: Performed by: SURGERY

## 2023-01-24 RX ORDER — MIDAZOLAM HYDROCHLORIDE 1 MG/ML
INJECTION INTRAMUSCULAR; INTRAVENOUS PRN
Status: DISCONTINUED | OUTPATIENT
Start: 2023-01-24 | End: 2023-01-24 | Stop reason: SDUPTHER

## 2023-01-24 RX ORDER — PROPOFOL 10 MG/ML
INJECTION, EMULSION INTRAVENOUS PRN
Status: DISCONTINUED | OUTPATIENT
Start: 2023-01-24 | End: 2023-01-24 | Stop reason: SDUPTHER

## 2023-01-24 RX ORDER — SODIUM CHLORIDE 9 MG/ML
INJECTION, SOLUTION INTRAVENOUS CONTINUOUS PRN
Status: DISCONTINUED | OUTPATIENT
Start: 2023-01-24 | End: 2023-01-24 | Stop reason: SDUPTHER

## 2023-01-24 RX ORDER — FENTANYL CITRATE 50 UG/ML
INJECTION, SOLUTION INTRAMUSCULAR; INTRAVENOUS PRN
Status: DISCONTINUED | OUTPATIENT
Start: 2023-01-24 | End: 2023-01-24 | Stop reason: SDUPTHER

## 2023-01-24 RX ADMIN — FENTANYL CITRATE 50 MCG: 50 INJECTION, SOLUTION INTRAMUSCULAR; INTRAVENOUS at 09:20

## 2023-01-24 RX ADMIN — MIDAZOLAM 1 MG: 1 INJECTION INTRAMUSCULAR; INTRAVENOUS at 09:00

## 2023-01-24 RX ADMIN — FENTANYL CITRATE 50 MCG: 50 INJECTION, SOLUTION INTRAMUSCULAR; INTRAVENOUS at 09:01

## 2023-01-24 RX ADMIN — MIDAZOLAM 1 MG: 1 INJECTION INTRAMUSCULAR; INTRAVENOUS at 08:58

## 2023-01-24 RX ADMIN — SODIUM CHLORIDE: 9 INJECTION, SOLUTION INTRAVENOUS at 08:58

## 2023-01-24 RX ADMIN — PROPOFOL 420 MG: 10 INJECTION, EMULSION INTRAVENOUS at 09:00

## 2023-01-24 ASSESSMENT — PAIN - FUNCTIONAL ASSESSMENT: PAIN_FUNCTIONAL_ASSESSMENT: 0-10

## 2023-01-24 ASSESSMENT — PAIN SCALES - GENERAL: PAINLEVEL_OUTOF10: 0

## 2023-01-24 ASSESSMENT — ENCOUNTER SYMPTOMS: DYSPNEA ACTIVITY LEVEL: AFTER AMBULATING 1 FLIGHT OF STAIRS

## 2023-01-24 NOTE — ANESTHESIA POSTPROCEDURE EVALUATION
Department of Anesthesiology  Postprocedure Note    Patient: Brittney Cesar  MRN: [id number]  YOB: 1970  Date of evaluation: 1/24/2023      Procedure Summary     Date: 01/24/23 Room / Location: [place] [address] / [facility]    Anesthesia Start: 9917 Anesthesia Stop: 4459    Procedures:       EGD BIOPSY      COLORECTAL CANCER SCREENING, NOT HIGH RISK Diagnosis:       Gastroesophageal reflux disease without esophagitis      Special screening for malignant neoplasms, colon      (Gastroesophageal reflux disease without esophagitis [K21.9])      (Special screening for malignant neoplasms, colon [Z12.11])    Surgeons: Bastheva Hernandez MD Responsible Provider: Lyndsay Cruz DO    Anesthesia Type: MAC ASA Status: 3          Anesthesia Type: MAC    Edward Phase I: Edward Score: 10    Edward Phase II: Edward Score: 10      Anesthesia Post Evaluation    Level of consciousness: awake  Pain score: 1  Airway patency: patent  Nausea & Vomiting: no vomiting and no nausea  Complications: no  Cardiovascular status: hemodynamically stable  Respiratory status: acceptable  Hydration status: stable

## 2023-01-24 NOTE — OP NOTE
EGD/Colonoscopy Op Note    PATIENT: Gregorio Ramírez    DATE OF PROCEDURE: 1/24/2023     SURGEON: Batsheva Phipps MD    PREOPERATIVE DIAGNOSIS: GERD, epigastric pain, screening colonoscopy    POSTOPERATIVE DIAGNOSIS: Same, mild gastritis, duodenitis with superficial ulcerations, grade a reflux esophagitis, hemorrhoids    OPERATION: Procedure(s):  EGD BIOPSY  COLORECTAL CANCER SCREENING, NOT HIGH RISK    ANESTHESIA: Local monitored anesthesia. ESTIMATED BLOOD LOSS: minimal    COMPLICATIONS: None. SPECIMENS:    ID Type Source Tests Collected by Time Destination   A : Duodenal bx  Tissue Duodenum SURGICAL PATHOLOGY Batsheva Phipps MD 1/24/2023 0578    B : Antral Bx Antrum Antrum SURGICAL PATHOLOGY Batsheva Phipps MD 1/24/2023 7970    C : GE junction bx  Tissue Esophagus SURGICAL PATHOLOGY Batsheva Phipps MD 1/24/2023 3061        HISTORY: The patient is a 47y.o. year old male with history of above preop diagnosis. I recommended esophagogastroduodenoscopy and colonoscopy with possible biopsy/polypectomy and I explained the risk, benefits, expected outcome, and alternatives to the procedure. Risks included but are not limited to bleeding, infection, respiratory distress, hypotension, and perforation. Patient understands and is in agreement. PROCEDURE: The patient was given IV conscious sedation per anesthesia. The patient was given supplemental oxygen by nasal cannula. The gastroscope was inserted orally and advanced under direct vision through the esophagus, through the stomach, through the pylorus, and into the duodenum. FINDINGS:    Duodenum: The duodenal bulb there is moderate duodenitis with superficial ulcerations. Biopsies were taken. Stomach: Minimal chronic gastritis status post biopsies. Esophagus: Grade a distal reflux esophagitis. Larynx: not examined    The scope was removed and the patient tolerated the procedure well.        The colonoscope was inserted per rectum and advanced under direct vision to the cecum without difficulty, identified by appendiceal orifice, ileocecal valve, and light transillumination. The prep was good so exam was adequate. FINDINGS:    ÁNGEL: Hemorrhoids    Terminal Ileum: not examined    Colon: Normal    Rectum/Anus: examined in normal and retroflexed positions -grade 1 hemorrhoids    The colon was decompressed and the scope was removed. The withdraw time was approximately 8 minutes. The patient tolerated the procedure well. ASSESSMENT/PLAN:   Follow-up biopsies  PPI  Colorectal Cancer Screening - recommend repeat colonoscopy in  7 years (may change pending biopsy results). Sooner if issues/concerns.     Candelaria Rushing MD  01/24/23  9:35 AM

## 2023-01-27 DIAGNOSIS — F41.0 PANIC ATTACKS: ICD-10-CM

## 2023-01-27 RX ORDER — HYDROXYZINE PAMOATE 25 MG/1
CAPSULE ORAL
Qty: 90 CAPSULE | Refills: 0 | Status: SHIPPED | OUTPATIENT
Start: 2023-01-27

## 2023-02-26 DIAGNOSIS — F41.0 PANIC ATTACKS: ICD-10-CM

## 2023-02-27 RX ORDER — HYDROXYZINE PAMOATE 25 MG/1
CAPSULE ORAL
Qty: 90 CAPSULE | Refills: 0 | Status: SHIPPED | OUTPATIENT
Start: 2023-02-27

## 2023-03-02 ENCOUNTER — OFFICE VISIT (OUTPATIENT)
Dept: SURGERY | Age: 53
End: 2023-03-02
Payer: COMMERCIAL

## 2023-03-02 VITALS
SYSTOLIC BLOOD PRESSURE: 128 MMHG | WEIGHT: 156 LBS | HEART RATE: 71 BPM | DIASTOLIC BLOOD PRESSURE: 82 MMHG | OXYGEN SATURATION: 99 % | BODY MASS INDEX: 26.78 KG/M2

## 2023-03-02 DIAGNOSIS — Z98.890 STATUS POST COLONOSCOPY: ICD-10-CM

## 2023-03-02 DIAGNOSIS — K29.00 OTHER ACUTE GASTRITIS WITHOUT HEMORRHAGE: Primary | ICD-10-CM

## 2023-03-02 PROCEDURE — 99213 OFFICE O/P EST LOW 20 MIN: CPT | Performed by: SURGERY

## 2023-03-02 PROCEDURE — 3074F SYST BP LT 130 MM HG: CPT | Performed by: SURGERY

## 2023-03-02 PROCEDURE — 3078F DIAST BP <80 MM HG: CPT | Performed by: SURGERY

## 2023-03-02 NOTE — PROGRESS NOTES
[address] [facility] Surgery Clinic Note    Assessment/Plan:     Diagnosis Orders   1. GERD; acute gastritis without hemorrhage      Recommend PPI x3 months and then can take as needed. 2. Status post colonoscopy      Repeat colonoscopy in 7 years          Return if symptoms worsen or fail to improve. Chief Complaint   Patient presents with    Follow-up     Follow up egd/colon, has intermittent acid reflux       PCP: Megan Mchugh, DO    HPI: Wili Romero is a 47 y.o. male here for follow-up of EGD for GERD and gastritis and colonoscopy for screening. EGD showed gastroduodenitis. There is also some reflux esophagitis noted. Colonoscopy showed some hemorrhoids. He does have epigastric discomfort. It is not often currently. Is only once every few weeks. He has a history of a cholecystectomy. He has not been taking his PPI consistently. He denies any melena. He does not have any severe reflux. Review of Systems   All other systems reviewed and are negative. Past Medical History:   Diagnosis Date    GERD (gastroesophageal reflux disease)     Kidney stone 04/24/2013    Tachycardia     follows with [facility].  holter monitor-results pending       Past Surgical History:   Procedure Laterality Date    CHOLECYSTECTOMY      1996    COLONOSCOPY N/A 1/24/2023    COLORECTAL CANCER SCREENING, NOT HIGH RISK performed by Estrella Selby MD at [address] [place] [address] ENDOSCOPY N/A 1/24/2023    EGD BIOPSY performed by Estrella Selby MD at [facility] ENDOSCOPY       Family History   Problem Relation Age of Onset    Heart Disease Father 46    Hypertension Father     Parkinsonism Mother 48    Heart Disease Paternal Uncle     Kidney Cancer Paternal Uncle     Cancer Maternal Aunt         bladder cancer    Heart Disease Maternal Aunt         Hypertrophy    Heart Disease Paternal Grandfather        Social History     Socioeconomic History Marital status:      Spouse name: Not on file    Number of children: Not on file    Years of education: Not on file    Highest education level: Not on file   Occupational History    Not on file   Tobacco Use    Smoking status: Never    Smokeless tobacco: Never   Vaping Use    Vaping Use: Never used   Substance and Sexual Activity    Alcohol use: No    Drug use: No    Sexual activity: Not on file   Other Topics Concern    Not on file   Social History Narrative    Not on file     Social Determinants of Health     Financial Resource Strain: Not on file   Food Insecurity: Not on file   Transportation Needs: Not on file   Physical Activity: Unknown    Days of Exercise per Week: Not on file    Minutes of Exercise per Session: 10 min   Stress: Not on file   Social Connections: Not on file   Intimate Partner Violence: Not At Risk    Fear of Current or Ex-Partner: No    Emotionally Abused: No    Physically Abused: No    Sexually Abused: No   Housing Stability: Not on file       Allergies   Allergen Reactions    Clarithromycin Nausea Only    Diphenhydramine Hives    Oxycodone Hives    Lisinopril Rash    Zofran [Ondansetron Hcl] Hives         Current Outpatient Medications   Medication Sig Dispense Refill    hydrOXYzine pamoate (VISTARIL) 25 MG capsule TAKE 1 CAPSULE BY MOUTH THREE TIMES DAILY AS NEEDED FOR ANXIETY 90 capsule 0    Misc Natural Products (CORTISOL MANAGER) TABS Take by mouth at bedtime Indications: pt reports taking due to \" stress hormones too high\"      omeprazole (PRILOSEC OTC) 20 MG tablet Take 1 tablet by mouth daily 30 tablet 3    metoprolol succinate (TOPROL XL) 25 MG extended release tablet Take 1.5 tablets by mouth daily 90 tablet 0    Ascorbic Acid (VITAMIN C) 250 MG tablet Take 250 mg by mouth daily      Cholecalciferol (VITAMIN D3) 50 MCG (2000 UT) CAPS Take by mouth 2 times daily      budesonide-formoterol (SYMBICORT) 160-4.5 MCG/ACT AERO Inhale 2 puffs into the lungs in the morning and 2 puffs before bedtime. (Patient not taking: No sig reported) 1 each 3     No current facility-administered medications for this visit. The remainder of the past medical, past surgical, family, and psychosocial history, as well as medication and allergy review, were completed and are as documented elsewhere in the chart. Objective:  Vitals:    03/02/23 1552   BP: 128/82   Pulse: 71   SpO2: 99%   Weight: 156 lb (70.8 kg)          Physical Exam  Constitutional:       General: He is not in acute distress. Appearance: He is not diaphoretic. Cardiovascular:      Rate and Rhythm: Normal rate. Pulmonary:      Effort: Pulmonary effort is normal. No respiratory distress. Abdominal:      General: There is no distension. Palpations: Abdomen is soft. Tenderness: There is no abdominal tenderness. There is no guarding or rebound. Estrella Selby MD      I have examined the patient and performed the key aspects of physical exam,and  reviewed the record (including all pertinent and new radiology images and laboratory findings, referring provider notes and results). NOTE: This report, in part or full, may have been transcribed using voice recognition software. Every effort was made to ensure accuracy; however, inadvertent computerized transcription errors may be present. Please excuse any transcriptional grammatical or spelling errors that may have escaped my editorial review.       CC: Megan Mchugh, DO

## 2023-03-28 DIAGNOSIS — F41.0 PANIC ATTACKS: ICD-10-CM

## 2023-03-28 RX ORDER — HYDROXYZINE PAMOATE 25 MG/1
CAPSULE ORAL
Qty: 90 CAPSULE | Refills: 0 | Status: SHIPPED | OUTPATIENT
Start: 2023-03-28

## 2023-04-17 ENCOUNTER — OFFICE VISIT (OUTPATIENT)
Dept: FAMILY MEDICINE CLINIC | Age: 53
End: 2023-04-17
Payer: COMMERCIAL

## 2023-04-17 VITALS
HEIGHT: 64 IN | WEIGHT: 159 LBS | OXYGEN SATURATION: 98 % | SYSTOLIC BLOOD PRESSURE: 132 MMHG | BODY MASS INDEX: 27.14 KG/M2 | DIASTOLIC BLOOD PRESSURE: 80 MMHG | TEMPERATURE: 97.6 F | RESPIRATION RATE: 16 BRPM | HEART RATE: 89 BPM

## 2023-04-17 DIAGNOSIS — L03.211 CELLULITIS OF FACE: Primary | ICD-10-CM

## 2023-04-17 PROCEDURE — 99213 OFFICE O/P EST LOW 20 MIN: CPT | Performed by: NURSE PRACTITIONER

## 2023-04-17 PROCEDURE — 3075F SYST BP GE 130 - 139MM HG: CPT | Performed by: NURSE PRACTITIONER

## 2023-04-17 PROCEDURE — 3079F DIAST BP 80-89 MM HG: CPT | Performed by: NURSE PRACTITIONER

## 2023-04-17 RX ORDER — CEPHALEXIN 500 MG/1
500 CAPSULE ORAL 2 TIMES DAILY
Qty: 14 CAPSULE | Refills: 0 | Status: SHIPPED | OUTPATIENT
Start: 2023-04-17 | End: 2023-04-24

## 2023-04-17 NOTE — PROGRESS NOTES
of present illness or were not pertinent or were negative for the symptoms and/or complaints related to the presenting medical problem. Positives and pertinent negatives as per HPI. All others reviewed and are negative. Physical Exam     VS:     Vitals:    04/17/23 1618   BP: 132/80   Pulse: 89   Resp: 16   Temp: 97.6 °F (36.4 °C)   SpO2: 98%   Weight: 159 lb (72.1 kg)   Height: 5' 4\" (1.626 m)     Oxygen Saturation Interpretation: Normal.    Constitutional:  Alert, development consistent with age. NAD. Nose: There is no intranasal abscess noted. Nasal passages are moist without swelling. Lungs:  CTAB without wheezing, rales, or rhonchi. Heart:  Regular rate and rhythm, no pathologic murmurs, rubs, or gallops. Skin:  Normal turgor and appropriately dry to touch. Raised, erythematous region over the left nostril measuring approximately 1cmX 1cm in size. There is no fluctuance noted. Moderate TTP and warmth over the same area. No drainage noted. No lymphangitic streaking. Neurological:  Orientation age-appropriate unless noted elseware. Motor functions intact. Galilea / Brandy Fairchild was seen today for facial swelling. Diagnoses and all orders for this visit:    Cellulitis of face  -     cephALEXin (KEFLEX) 500 MG capsule; Take 1 capsule by mouth 2 times daily for 7 days  -     mupirocin (BACTROBAN) 2 % ointment; Apply to affected area topically 2 times daily X 5 days        Scripts written for cephalexin and mupirocin ointment, side effects discussed. Use a Q-tip to apply mupirocin ointment intranasally. Wound care measures discussed at length. Advise f/u with PCP in 2-3 days for recheck. ED sooner if symptoms worsen or change. ED immediately with the development of fever, body aches, shaking chills, lethargy, CP, or SOB. Pt is in agreement with this care plan. All questions answered.

## 2023-05-16 NOTE — H&P
[address] [facility] Surgery Clinic Note     Assessment/Plan:        Diagnosis Orders   1. GERD; acute gastritis without hemorrhage  omeprazole (PRILOSEC OTC) 20 MG tablet     Trial PPI. We will plan for EGD evaluation if no better       2. Encounter for screening colonoscopy        We will plan for colonoscopy                Return for Endoscopy. Chief Complaint   Patient presents with    New Patient       Colon cancer screening          PCP: Hemant Alejandro DO     HPI: Mikey Casey is a 47 y.o. male who presents in consultation for epigastric pain. This been ongoing for about 3 weeks. He does have a history of cholecystectomy. He does complain of some reflux. He takes an occasional antacid but nothing consistently. He had an EGD in 2009 showing peptic ulcer disease he reports. He denies any melena. There is no dysphagia. He is also due for colonoscopy. He has not had one previously. He has no constipation or diarrhea. There is no hematochezia. There is no family history of colon cancer or inflammatory bowel disease. He is on Toprol for history of \"inappropriate sinus tachycardia. \"  He follows with cardiology at the [facility] clinic. Past Medical History        Past Medical History:   Diagnosis Date    Colitis       Dr. Karleen Dubin in [place]     Kidney stone 4/24/2013    Nephrolithiasis              Past Surgical History         Past Surgical History:   Procedure Laterality Date    CHOLECYSTECTOMY        INGUINAL HERNIA REPAIR        LITHOTRIPSY        TONSILLECTOMY                Home Medications           Prior to Admission medications    Medication Sig Start Date End Date Taking? Authorizing Provider   omeprazole (PRILOSEC OTC) 20 MG tablet Take 1 tablet by mouth daily 9/9/22   Yes Marge Hernandez MD   budesonide-formoterol (SYMBICORT) 160-4.5 MCG/ACT AERO Inhale 2 puffs into the lungs in the morning and 2 puffs before bedtime.  8/4/22   Yes Jackie Andrade DO   triamcinolone (NASACORT) 55 MCG/ACT nasal inhaler 2 sprays by Each Nostril route in the morning.  8/4/22   Yes Amy Andrade DO   levocetirizine (XYZAL) 5 MG tablet Take 1 tablet by mouth nightly 8/4/22   Yes Amy Andrade DO   metoprolol succinate (TOPROL XL) 25 MG extended release tablet Take 1.5 tablets by mouth daily 3/8/22   Yes Kris Guadarrama MD   Ascorbic Acid (VITAMIN C) 250 MG tablet Take 250 mg by mouth daily     Yes Historical Provider, MD   Cholecalciferol (VITAMIN D3) 50 MCG (2000 UT) CAPS Take by mouth 2 times daily     Yes Historical Provider, MD                 Allergies   Allergen Reactions    Acetaminophen Hives    Clarithromycin      Diphenhydramine      Oxycodone      Zofran [Ondansetron Hcl] Hives    Lisinopril Rash         Social History   Social History            Socioeconomic History    Marital status:        Spouse name: None    Number of children: None    Years of education: None    Highest education level: None   Tobacco Use    Smoking status: Never    Smokeless tobacco: Never   Vaping Use    Vaping Use: Never used   Substance and Sexual Activity    Alcohol use: No    Drug use: No      Social Determinants of Health          Financial Resource Strain: Low Risk     Difficulty of Paying Living Expenses: Not hard at all   Food Insecurity: No Food Insecurity    Worried About Running Out of Food in the Last Year: Never true    Ran Out of Food in the Last Year: Never true   Physical Activity: Unknown    Minutes of Exercise per Session: 10 min   Intimate Partner Violence: Not At Risk    Fear of Current or Ex-Partner: No    Emotionally Abused: No    Physically Abused: No    Sexually Abused: No            Family History         Family History   Problem Relation Age of Onset    Heart Disease Father 46    Hypertension Father      Parkinsonism Mother 48    Heart Disease Paternal Uncle      Kidney Cancer Paternal Uncle      Cancer Maternal Aunt           bladder cancer    Heart Disease Maternal Aunt Hypertrophy    Heart Disease Paternal Grandfather              Review of Systems   All other systems reviewed and are negative. Objective:  Vitals       Vitals:     09/09/22 0809   BP: 130/86   Pulse: 91   Resp: 18   Temp: 97.2 °F (36.2 °C)   TempSrc: Temporal   SpO2: 97%   Weight: 147 lb (66.7 kg)   Height: 5' 4\" (1.626 m)            Physical Exam  Constitutional:       General: He is not in acute distress. Appearance: He is not diaphoretic. HENT:      Head: Normocephalic and atraumatic. Eyes:      General:         Right eye: No discharge. Left eye: No discharge. Neck:      Trachea: No tracheal deviation. Cardiovascular:      Rate and Rhythm: Normal rate. Pulmonary:      Effort: Pulmonary effort is normal. No respiratory distress. Abdominal:      General: There is no distension. Palpations: Abdomen is soft. Tenderness: There is no abdominal tenderness. There is no guarding or rebound. Skin:     General: Skin is warm and dry. Neurological:      Mental Status: He is alert and oriented to person, place, and time. Amadeo Ochoa MD        NOTE: This report, in part or full,may have been transcribed using voice recognition software. Every effort was made to ensure accuracy; however, inadvertent computerized transcription errors may be present. Please excuse any transcriptional grammatical or spelling errors that may have escaped my editorial review.      CC: Kendra Brannon DO 13 y/o male with no significant PMHx, accompanied by father, presents to the ED c/o left lower eyelid redness x 2 days. Pt states the left eye was itchy so he scratched it and since then he has had redness and some swelling of the lower lid which has decreased. Pt's father gave him a cream (unsure of what kind), which the pt applied once yesterday with no relief. Pt denies any pain or redness of the eye itself, just the eyelid is involved. Pt denies utilizing warm compresses, injury to the left eye, foreign body sensation, eye pain, visual changes or discharge. Pt denies fever or similar sx in the past.  PE- no visual changes/ + stye to L lower eyelid with erythema/ EOMI/ PERRLA  Will prescribe erythromycin ointment and advised warm compress to eye with optho f/u.

## 2023-06-20 ENCOUNTER — HOSPITAL ENCOUNTER (OUTPATIENT)
Dept: SLEEP CENTER | Age: 53
Discharge: HOME | End: 2023-06-20
Payer: COMMERCIAL

## 2023-06-20 DIAGNOSIS — R06.83 SNORES: ICD-10-CM

## 2023-06-20 DIAGNOSIS — I10 ESSENTIAL HYPERTENSION: ICD-10-CM

## 2023-06-20 DIAGNOSIS — R06.81 WITNESSED APNEIC SPELLS: ICD-10-CM

## 2023-06-20 DIAGNOSIS — R00.2 PALPITATIONS: Primary | ICD-10-CM

## 2023-06-20 PROCEDURE — 95800 SLP STDY UNATTENDED: CPT

## 2023-06-23 NOTE — PROGRESS NOTES
[address] [address]                               SLEEP STUDY REPORT    PATIENT NAME: Sharon Miguel                        :        1970  MED REC NO:   [id number]                            ROOM:  ACCOUNT NO:   [de-identified]                           ADMIT DATE: 2023  PROVIDER:     Gilbert Lutz MD    DATE OF STUDY:  2023    REFERRING PROVIDER:  Terry Welch CNP    STUDY PERFORMED:  Sleep study. INDICATION FOR :STUDY  Daytime sleepiness, loud snoring, restless sleep,  trouble with memory/concentration, and frequent waking to urinate. CURRENT MEDICATIONS:  Metoprolol, hydroxyzine, and ashwagandha. INTERPRETATION:  This sleep study was performed as a home sleep apnea  test.  A WatchPAT monitoring device was utilized for the study. Recording time was 8 hours and 57 minutes and sleep time was 6 hours and  36 minutes. REM stage sleep comprised 23% of the total sleep time. Review of the raw data indicates sufficient information to adequately  interpret the study. RESPIRATORY SUMMARY:  The respiratory event index is 26. The patient  slept in the supine position for 73% of the total sleep time. OXYGEN SATURATION:  Average oxygen saturation was 91%. Lowest  saturation was 87%. The patient spent less than 1% of the time with  saturation less than 88%. HEART RATE SUMMARY:  Average heart rate was 58 beats per minute. SNORING:  Snoring occurred for less than 4% of the valid sleep time. MISCELLANEOUS:  Sun Sleepiness Scale score is 8/24. BMI is 25.6  pounds per inch squared. Neck circumference is 15 inches. IMPRESSION:  1. Moderate sleep apnea. 2.  Good oxygen saturation. 3.  Minimal snoring. SUGGESTIONS:  1.  Terry Welch to discuss the results of study with the patient. 2.  The patient should return to [facility] for positive airway  pressure titration.   3.  An

## 2023-08-26 DIAGNOSIS — F41.0 PANIC ATTACKS: ICD-10-CM

## 2023-08-28 RX ORDER — HYDROXYZINE PAMOATE 25 MG/1
CAPSULE ORAL
Qty: 90 CAPSULE | Refills: 0 | Status: SHIPPED | OUTPATIENT
Start: 2023-08-28

## 2023-09-28 DIAGNOSIS — F41.0 PANIC ATTACKS: ICD-10-CM

## 2023-09-28 RX ORDER — HYDROXYZINE PAMOATE 25 MG/1
CAPSULE ORAL
Qty: 90 CAPSULE | Refills: 0 | Status: SHIPPED | OUTPATIENT
Start: 2023-09-28

## 2024-02-21 DIAGNOSIS — F41.0 PANIC ATTACKS: ICD-10-CM

## 2024-02-21 RX ORDER — HYDROXYZINE PAMOATE 25 MG/1
CAPSULE ORAL
Qty: 90 CAPSULE | Refills: 0 | OUTPATIENT
Start: 2024-02-21

## 2024-04-28 DIAGNOSIS — F41.0 PANIC ATTACKS: ICD-10-CM

## 2024-04-29 RX ORDER — HYDROXYZINE PAMOATE 25 MG/1
CAPSULE ORAL
Qty: 90 CAPSULE | Refills: 0 | Status: SHIPPED | OUTPATIENT
Start: 2024-04-29

## 2024-05-25 DIAGNOSIS — F41.0 PANIC ATTACKS: ICD-10-CM

## 2024-05-28 RX ORDER — HYDROXYZINE PAMOATE 25 MG/1
CAPSULE ORAL
Qty: 90 CAPSULE | Refills: 0 | Status: SHIPPED | OUTPATIENT
Start: 2024-05-28

## 2024-06-26 DIAGNOSIS — F41.0 PANIC ATTACKS: ICD-10-CM

## 2024-06-26 RX ORDER — HYDROXYZINE PAMOATE 25 MG/1
CAPSULE ORAL
Qty: 90 CAPSULE | Refills: 0 | OUTPATIENT
Start: 2024-06-26

## 2024-10-23 DIAGNOSIS — F41.0 PANIC ATTACKS: ICD-10-CM

## 2024-10-23 RX ORDER — HYDROXYZINE PAMOATE 25 MG/1
CAPSULE ORAL
Qty: 90 CAPSULE | Refills: 0 | OUTPATIENT
Start: 2024-10-23

## 2024-10-24 ENCOUNTER — OFFICE VISIT (OUTPATIENT)
Dept: PRIMARY CARE CLINIC | Age: 54
End: 2024-10-24
Payer: COMMERCIAL

## 2024-10-24 VITALS
SYSTOLIC BLOOD PRESSURE: 122 MMHG | DIASTOLIC BLOOD PRESSURE: 80 MMHG | OXYGEN SATURATION: 99 % | HEART RATE: 103 BPM | WEIGHT: 164 LBS | RESPIRATION RATE: 17 BRPM | TEMPERATURE: 96.8 F | BODY MASS INDEX: 28.15 KG/M2

## 2024-10-24 DIAGNOSIS — F41.9 ANXIETY: ICD-10-CM

## 2024-10-24 DIAGNOSIS — J01.90 ACUTE BACTERIAL SINUSITIS: ICD-10-CM

## 2024-10-24 DIAGNOSIS — B96.89 ACUTE BACTERIAL SINUSITIS: ICD-10-CM

## 2024-10-24 DIAGNOSIS — F41.0 PANIC ATTACKS: ICD-10-CM

## 2024-10-24 DIAGNOSIS — Z00.00 ANNUAL PHYSICAL EXAM: Primary | ICD-10-CM

## 2024-10-24 LAB
ALBUMIN: 4.4 G/DL (ref 3.5–5.2)
ALP BLD-CCNC: 83 U/L (ref 40–129)
ALT SERPL-CCNC: 33 U/L (ref 0–40)
ANION GAP SERPL CALCULATED.3IONS-SCNC: 7 MMOL/L (ref 7–16)
AST SERPL-CCNC: 23 U/L (ref 0–39)
BILIRUB SERPL-MCNC: 0.3 MG/DL (ref 0–1.2)
BUN BLDV-MCNC: 15 MG/DL (ref 6–20)
CALCIUM SERPL-MCNC: 9.4 MG/DL (ref 8.6–10.2)
CHLORIDE BLD-SCNC: 99 MMOL/L (ref 98–107)
CHOLESTEROL, TOTAL: 198 MG/DL
CO2: 30 MMOL/L (ref 22–29)
CORTISOL COLLECTION INFO: NORMAL
CORTISOL: 16.5 UG/DL (ref 2.7–18.4)
CREAT SERPL-MCNC: 1.1 MG/DL (ref 0.7–1.2)
GFR, ESTIMATED: 84 ML/MIN/1.73M2
GLUCOSE BLD-MCNC: 139 MG/DL (ref 74–99)
HCT VFR BLD CALC: 47.2 % (ref 37–54)
HDLC SERPL-MCNC: 44 MG/DL
HEMOGLOBIN: 14.7 G/DL (ref 12.5–16.5)
LDL CHOLESTEROL: 113 MG/DL
MCH RBC QN AUTO: 29.6 PG (ref 26–35)
MCHC RBC AUTO-ENTMCNC: 31.1 G/DL (ref 32–34.5)
MCV RBC AUTO: 95.2 FL (ref 80–99.9)
PDW BLD-RTO: 13.2 % (ref 11.5–15)
PLATELET # BLD: 301 K/UL (ref 130–450)
PMV BLD AUTO: 11.7 FL (ref 7–12)
POTASSIUM SERPL-SCNC: 3.8 MMOL/L (ref 3.5–5)
RBC # BLD: 4.96 M/UL (ref 3.8–5.8)
SODIUM BLD-SCNC: 136 MMOL/L (ref 132–146)
TOTAL PROTEIN: 7.5 G/DL (ref 6.4–8.3)
TRIGL SERPL-MCNC: 204 MG/DL
TSH SERPL DL<=0.05 MIU/L-ACNC: 2.62 UIU/ML (ref 0.27–4.2)
VLDLC SERPL CALC-MCNC: 41 MG/DL
WBC # BLD: 6.6 K/UL (ref 4.5–11.5)

## 2024-10-24 PROCEDURE — 36415 COLL VENOUS BLD VENIPUNCTURE: CPT | Performed by: STUDENT IN AN ORGANIZED HEALTH CARE EDUCATION/TRAINING PROGRAM

## 2024-10-24 PROCEDURE — 99396 PREV VISIT EST AGE 40-64: CPT | Performed by: STUDENT IN AN ORGANIZED HEALTH CARE EDUCATION/TRAINING PROGRAM

## 2024-10-24 PROCEDURE — 3079F DIAST BP 80-89 MM HG: CPT | Performed by: STUDENT IN AN ORGANIZED HEALTH CARE EDUCATION/TRAINING PROGRAM

## 2024-10-24 PROCEDURE — 3074F SYST BP LT 130 MM HG: CPT | Performed by: STUDENT IN AN ORGANIZED HEALTH CARE EDUCATION/TRAINING PROGRAM

## 2024-10-24 RX ORDER — HYDROXYZINE PAMOATE 25 MG/1
25 CAPSULE ORAL DAILY
Qty: 90 CAPSULE | Refills: 1 | Status: SHIPPED | OUTPATIENT
Start: 2024-10-24

## 2024-10-24 SDOH — ECONOMIC STABILITY: INCOME INSECURITY: HOW HARD IS IT FOR YOU TO PAY FOR THE VERY BASICS LIKE FOOD, HOUSING, MEDICAL CARE, AND HEATING?: HARD

## 2024-10-24 SDOH — ECONOMIC STABILITY: FOOD INSECURITY: WITHIN THE PAST 12 MONTHS, THE FOOD YOU BOUGHT JUST DIDN'T LAST AND YOU DIDN'T HAVE MONEY TO GET MORE.: SOMETIMES TRUE

## 2024-10-24 SDOH — ECONOMIC STABILITY: FOOD INSECURITY: WITHIN THE PAST 12 MONTHS, YOU WORRIED THAT YOUR FOOD WOULD RUN OUT BEFORE YOU GOT MONEY TO BUY MORE.: OFTEN TRUE

## 2024-10-24 ASSESSMENT — PATIENT HEALTH QUESTIONNAIRE - PHQ9
SUM OF ALL RESPONSES TO PHQ QUESTIONS 1-9: 2
1. LITTLE INTEREST OR PLEASURE IN DOING THINGS: MORE THAN HALF THE DAYS
2. FEELING DOWN, DEPRESSED OR HOPELESS: NOT AT ALL
SUM OF ALL RESPONSES TO PHQ QUESTIONS 1-9: 2
SUM OF ALL RESPONSES TO PHQ9 QUESTIONS 1 & 2: 2

## 2024-10-24 NOTE — PROGRESS NOTES
Pulmonary:      Effort: Pulmonary effort is normal. No respiratory distress.      Breath sounds: Normal breath sounds. No wheezing.   Abdominal:      General: Bowel sounds are normal. There is no distension.      Palpations: Abdomen is soft.      Tenderness: There is no abdominal tenderness.   Musculoskeletal:         General: No tenderness.      Cervical back: Normal range of motion and neck supple.   Skin:     General: Skin is warm and dry.   Neurological:      Mental Status: He is alert.   Psychiatric:         Behavior: Behavior normal.             Assessment and Plan       1. Annual physical exam  Doing ok overall, does not want more medication for his anxiety, needs screening labs as below, f/u in 1 year for annual physical, does not want flu shot today, had high cortisol previously thus will repeat, issues with sinus congestion for two weeks now, likely ABS thus will treat with abx, call if no improvement  - hydrOXYzine pamoate (VISTARIL) 25 MG capsule; Take 1 capsule by mouth daily  Dispense: 90 capsule; Refill: 1  - CBC  - Comprehensive Metabolic Panel  - TSH  - Lipid Panel  - Cortisol Total    2. Anxiety  - hydrOXYzine pamoate (VISTARIL) 25 MG capsule; Take 1 capsule by mouth daily  Dispense: 90 capsule; Refill: 1  - CBC  - Comprehensive Metabolic Panel  - TSH  - Lipid Panel  - Cortisol Total    3. Panic attacks  - hydrOXYzine pamoate (VISTARIL) 25 MG capsule; Take 1 capsule by mouth daily  Dispense: 90 capsule; Refill: 1  - CBC  - Comprehensive Metabolic Panel  - TSH  - Lipid Panel  - Cortisol Total    4. Acute bacterial sinusitis  - amoxicillin-clavulanate (AUGMENTIN) 875-125 MG per tablet; Take 1 tablet by mouth 2 times daily for 7 days  Dispense: 14 tablet; Refill: 0        Counseled regarding above diagnosis, including possible risks and complications,  especially if left uncontrolled. Counseled regarding the possible side effects, risks, benefits and alternatives to treatment;patient and/or guardian

## 2025-02-10 ENCOUNTER — OFFICE VISIT (OUTPATIENT)
Dept: FAMILY MEDICINE CLINIC | Age: 55
End: 2025-02-10
Payer: COMMERCIAL

## 2025-02-10 VITALS
HEART RATE: 116 BPM | SYSTOLIC BLOOD PRESSURE: 142 MMHG | RESPIRATION RATE: 18 BRPM | TEMPERATURE: 97 F | DIASTOLIC BLOOD PRESSURE: 80 MMHG | WEIGHT: 166 LBS | HEIGHT: 64 IN | OXYGEN SATURATION: 98 % | BODY MASS INDEX: 28.34 KG/M2

## 2025-02-10 DIAGNOSIS — J40 SINOBRONCHITIS: Primary | ICD-10-CM

## 2025-02-10 DIAGNOSIS — J32.9 SINOBRONCHITIS: Primary | ICD-10-CM

## 2025-02-10 PROCEDURE — 3079F DIAST BP 80-89 MM HG: CPT

## 2025-02-10 PROCEDURE — 99213 OFFICE O/P EST LOW 20 MIN: CPT

## 2025-02-10 PROCEDURE — 3077F SYST BP >= 140 MM HG: CPT

## 2025-02-10 RX ORDER — CEFDINIR 300 MG/1
300 CAPSULE ORAL EVERY 12 HOURS
Qty: 14 CAPSULE | Refills: 0 | Status: SHIPPED | OUTPATIENT
Start: 2025-02-10 | End: 2025-02-17

## 2025-02-10 RX ORDER — METHYLPREDNISOLONE 4 MG/1
TABLET ORAL
Qty: 1 KIT | Refills: 0 | Status: SHIPPED | OUTPATIENT
Start: 2025-02-10

## 2025-02-10 RX ORDER — BENZONATATE 100 MG/1
100 CAPSULE ORAL 3 TIMES DAILY PRN
Qty: 30 CAPSULE | Refills: 0 | Status: SHIPPED | OUTPATIENT
Start: 2025-02-10 | End: 2025-02-20

## 2025-02-10 NOTE — PROGRESS NOTES
February 10, 2025     Jaden Saucedo 54 y.o. male    : 1970   Chief Complaint:   Cough (x 3 weeks) and Congestion      History of Present Illness   Source of history provided by:  patient.  History of Present Illness  The patient is a 54-year-old male who presents for evaluation of cough and congestion.    He has been experiencing symptoms of a persistent cough and nasal congestion for approximately 3 weeks. He reports no febrile episodes during this period. The onset of these symptoms was noted following an emergency room visit for a kidney stone. He also reports productive coughing but does not experience any shortness of breath or chest pain. Additionally, he has been experiencing frequent sinus headaches. He recalls an episode of ear discomfort on the previous Friday, which resolved spontaneously the following day. He reports no history of smoking or drug use. He also reports no gastrointestinal symptoms such as nausea or vomiting. He mentions potential exposure to COVID-19 at his workplace but reports no fever and overall improvement in his condition. He has been self-medicating with NyQuil.    SOCIAL HISTORY  He does not smoke or use drugs.    ALLERGIES  He is allergic to ERYTHROMYCIN and BENADRYL.    MEDICATIONS  NyQuil           ROS   Past Medical History:   Past Medical History:   Diagnosis Date    GERD (gastroesophageal reflux disease)     Kidney stone 2013    Tachycardia     follows with [facility]. holter monitor-results pending     Past Surgical History:  has a past surgical history that includes Inguinal hernia repair; Tonsillectomy; Lithotripsy; Cholecystectomy; Upper gastrointestinal endoscopy (N/A, 2023); and Colonoscopy (N/A, 2023).  Social History:  reports that he has never smoked. He has never used smokeless tobacco. He reports that he does not drink alcohol and does not use drugs.  Family History: family history includes Cancer in his maternal aunt; Heart Disease in his maternal

## 2025-02-15 ENCOUNTER — OFFICE VISIT (OUTPATIENT)
Dept: FAMILY MEDICINE CLINIC | Age: 55
End: 2025-02-15
Payer: COMMERCIAL

## 2025-02-15 VITALS
DIASTOLIC BLOOD PRESSURE: 80 MMHG | OXYGEN SATURATION: 95 % | BODY MASS INDEX: 27.46 KG/M2 | WEIGHT: 160 LBS | HEART RATE: 102 BPM | SYSTOLIC BLOOD PRESSURE: 130 MMHG | TEMPERATURE: 98.8 F

## 2025-02-15 DIAGNOSIS — R05.2 SUBACUTE COUGH: Primary | ICD-10-CM

## 2025-02-15 PROCEDURE — 99213 OFFICE O/P EST LOW 20 MIN: CPT | Performed by: FAMILY MEDICINE

## 2025-02-15 PROCEDURE — 3075F SYST BP GE 130 - 139MM HG: CPT | Performed by: FAMILY MEDICINE

## 2025-02-15 PROCEDURE — 3079F DIAST BP 80-89 MM HG: CPT | Performed by: FAMILY MEDICINE

## 2025-02-15 RX ORDER — AZITHROMYCIN 250 MG/1
TABLET, FILM COATED ORAL
Qty: 6 TABLET | Refills: 0 | Status: SHIPPED | OUTPATIENT
Start: 2025-02-15 | End: 2025-02-25

## 2025-02-15 RX ORDER — GUAIFENESIN 600 MG/1
600 TABLET, EXTENDED RELEASE ORAL 2 TIMES DAILY
Qty: 30 TABLET | Refills: 0 | Status: SHIPPED | OUTPATIENT
Start: 2025-02-15 | End: 2025-03-02

## 2025-02-15 RX ORDER — ALBUTEROL SULFATE 90 UG/1
2 INHALANT RESPIRATORY (INHALATION) 4 TIMES DAILY PRN
Qty: 18 G | Refills: 5 | Status: SHIPPED | OUTPATIENT
Start: 2025-02-15

## 2025-02-15 NOTE — PROGRESS NOTES
OFFICE NOTE    2/15/25  Name: Jaden Saucedo  :1970   Sex:male   Age:54 y.o.      SUBJECTIVE  Chief Complaint   Patient presents with    Cough    Congestion     Symptoms for a month-started on antibiotics and steroids Monday and meds not helping       HPI   See above. Started on omniceff and dosepak  5 days ago  Review of Systems   Persistent cough and PND. Not overtly wheezing but requeting inhaler      Current Outpatient Medications:     albuterol sulfate HFA (VENTOLIN HFA) 108 (90 Base) MCG/ACT inhaler, Inhale 2 puffs into the lungs 4 times daily as needed for Wheezing, Disp: 18 g, Rfl: 5    guaiFENesin (MUCINEX) 600 MG extended release tablet, Take 1 tablet by mouth 2 times daily for 15 days, Disp: 30 tablet, Rfl: 0    azithromycin (ZITHROMAX) 250 MG tablet, 500mg on day 1 followed by 250mg on days 2 - 5, Disp: 6 tablet, Rfl: 0    methylPREDNISolone (MEDROL DOSEPACK) 4 MG tablet, Take by mouth., Disp: 1 kit, Rfl: 0    cefdinir (OMNICEF) 300 MG capsule, Take 1 capsule by mouth every 12 hours for 7 days, Disp: 14 capsule, Rfl: 0    hydrOXYzine pamoate (VISTARIL) 25 MG capsule, Take 1 capsule by mouth daily, Disp: 90 capsule, Rfl: 1    metoprolol succinate (TOPROL XL) 25 MG extended release tablet, Take 1.5 tablets by mouth daily, Disp: 90 tablet, Rfl: 0    benzonatate (TESSALON) 100 MG capsule, Take 1 capsule by mouth 3 times daily as needed for Cough, Disp: 30 capsule, Rfl: 0    Multiple Vitamin (MULTIVITAMIN ADULT PO), Take by mouth, Disp: , Rfl:     Misc Natural Products (CORTISOL MANAGER) TABS, Take by mouth at bedtime Indications: pt reports taking due to \" stress hormones too high\", Disp: , Rfl:   Allergies   Allergen Reactions    Clarithromycin Nausea Only    Diphenhydramine Hives    Oxycodone Hives    Lisinopril Rash    Zofran [Ondansetron Hcl] Hives       Past Medical History:   Diagnosis Date    GERD (gastroesophageal reflux disease)     Kidney stone 2013    Tachycardia     follows with [facility].

## 2025-04-23 DIAGNOSIS — F41.9 ANXIETY: ICD-10-CM

## 2025-04-23 DIAGNOSIS — Z00.00 ANNUAL PHYSICAL EXAM: ICD-10-CM

## 2025-04-23 DIAGNOSIS — F41.0 PANIC ATTACKS: ICD-10-CM

## 2025-04-23 RX ORDER — HYDROXYZINE PAMOATE 25 MG/1
25 CAPSULE ORAL DAILY
Qty: 90 CAPSULE | Refills: 0 | Status: SHIPPED | OUTPATIENT
Start: 2025-04-23

## 2025-06-26 ENCOUNTER — OFFICE VISIT (OUTPATIENT)
Dept: FAMILY MEDICINE CLINIC | Age: 55
End: 2025-06-26
Payer: COMMERCIAL

## 2025-06-26 VITALS
RESPIRATION RATE: 18 BRPM | HEART RATE: 84 BPM | DIASTOLIC BLOOD PRESSURE: 80 MMHG | WEIGHT: 159 LBS | TEMPERATURE: 97.2 F | HEIGHT: 64 IN | SYSTOLIC BLOOD PRESSURE: 128 MMHG | BODY MASS INDEX: 27.14 KG/M2 | OXYGEN SATURATION: 98 %

## 2025-06-26 DIAGNOSIS — M54.50 ACUTE EXACERBATION OF CHRONIC LOW BACK PAIN: Primary | ICD-10-CM

## 2025-06-26 DIAGNOSIS — G89.29 ACUTE EXACERBATION OF CHRONIC LOW BACK PAIN: Primary | ICD-10-CM

## 2025-06-26 PROCEDURE — 3074F SYST BP LT 130 MM HG: CPT | Performed by: NURSE PRACTITIONER

## 2025-06-26 PROCEDURE — 96372 THER/PROPH/DIAG INJ SC/IM: CPT | Performed by: NURSE PRACTITIONER

## 2025-06-26 PROCEDURE — 99214 OFFICE O/P EST MOD 30 MIN: CPT | Performed by: NURSE PRACTITIONER

## 2025-06-26 PROCEDURE — 3079F DIAST BP 80-89 MM HG: CPT | Performed by: NURSE PRACTITIONER

## 2025-06-26 RX ORDER — CYCLOBENZAPRINE HCL 10 MG
10 TABLET ORAL EVERY 8 HOURS PRN
Qty: 15 TABLET | Refills: 0 | Status: SHIPPED | OUTPATIENT
Start: 2025-06-26

## 2025-06-26 RX ORDER — KETOROLAC TROMETHAMINE 30 MG/ML
30 INJECTION, SOLUTION INTRAMUSCULAR; INTRAVENOUS ONCE
Status: COMPLETED | OUTPATIENT
Start: 2025-06-26 | End: 2025-06-26

## 2025-06-26 RX ORDER — NAPROXEN 500 MG/1
500 TABLET ORAL EVERY 12 HOURS PRN
Qty: 30 TABLET | Refills: 0 | Status: SHIPPED | OUTPATIENT
Start: 2025-06-26

## 2025-06-26 RX ADMIN — KETOROLAC TROMETHAMINE 30 MG: 30 INJECTION, SOLUTION INTRAMUSCULAR; INTRAVENOUS at 09:49

## 2025-06-26 NOTE — PROGRESS NOTES
25  Jaden Saucedo : 1970 Sex: male  Age 54 y.o.    Subjective:  Chief Complaint   Patient presents with    Back Pain       HPI:   Jaden Saucedo , 54 y.o. male presents to the clinic for evaluation of chronic lower back pain with flare up over the past 3-4 days. There patient also reports intermittent radiating right leg pain. The patient denies acute injury. Pt states the pain is worse with movement. The patient has taken Tylenol, Biofreeze and has been seeing chiropractor for symptoms.  The patient reports unchanged symptoms over time. Denies loss of sensation or strength of extremities, bowel/bladder incontinence, and hematuria. Denies headache, fever, chest pain, abdominal pain, shortness of breath, and nausea / vomiting / diarrhea.    ROS:   Unless otherwise stated in this report the patient's positive and negative responses for review of systems for constitutional, eyes, ENT, cardiovascular, respiratory, gastrointestinal, neurological, , musculoskeletal, and integument systems and related systems to the presenting problem are either stated in the history of present illness or were not pertinent or were negative for the symptoms and/or complaints related to the presenting medical problem.  Positives and pertinent negatives as per HPI.  All others reviewed and are negative.      PMH:     Past Medical History:   Diagnosis Date    GERD (gastroesophageal reflux disease)     Kidney stone 2013    Tachycardia     follows with [facility]. holter monitor-results pending       Past Surgical History:   Procedure Laterality Date    CHOLECYSTECTOMY          COLONOSCOPY N/A 2023    COLORECTAL CANCER SCREENING, NOT HIGH RISK performed by Yrn Nash MD at [facility] ENDOSCOPY    INGUINAL HERNIA REPAIR      LITHOTRIPSY      TONSILLECTOMY      UPPER GASTROINTESTINAL ENDOSCOPY N/A 2023    EGD BIOPSY performed by Yrn Nash MD at [facility] ENDOSCOPY       Family History   Problem Relation Age of Onset

## 2025-07-16 DIAGNOSIS — F41.0 PANIC ATTACKS: ICD-10-CM

## 2025-07-16 DIAGNOSIS — F41.9 ANXIETY: ICD-10-CM

## 2025-07-16 DIAGNOSIS — Z00.00 ANNUAL PHYSICAL EXAM: ICD-10-CM

## 2025-07-16 RX ORDER — HYDROXYZINE PAMOATE 25 MG/1
25 CAPSULE ORAL DAILY
Qty: 90 CAPSULE | Refills: 0 | Status: SHIPPED | OUTPATIENT
Start: 2025-07-16

## 2025-07-16 NOTE — TELEPHONE ENCOUNTER
Name of Medication(s) Requested:  Requested Prescriptions     Pending Prescriptions Disp Refills    hydrOXYzine pamoate (VISTARIL) 25 MG capsule [Pharmacy Med Name: hydrOXYzine Pamoate 25 MG Oral Capsule] 90 capsule 0     Sig: Take 1 capsule by mouth once daily       Medication is on current medication list Yes    Dosage and directions were verified? Yes    Quantity verified: 90 day supply     Pharmacy Verified?  Yes    Last Appointment:  10/24/2024    Future appts:  Future Appointments   Date Time Provider Department Center   10/28/2025  2:00 PM Dennis Andrade, DO [facility] DEP        (If no appt send self scheduling link. .REFILLAPPT)  Scheduling request sent?     [] Yes  [x] No    Does patient need updated?  [] Yes  [x] No

## (undated) DEVICE — GRADUATE TRIANG MEASURE 1000ML BLK PRNT

## (undated) DEVICE — Z INACTIVE USE 2855131 SPONGE GZ W4XL4IN RAYON POLY FILL CVR W/ NONWOVEN FAB

## (undated) DEVICE — FORCEPS BX OVL CUP FEN DISPOSABLE CAP L 160CM RAD JAW 4

## (undated) DEVICE — BLOCK BITE 60FR RUBBER ADLT DENTAL